# Patient Record
(demographics unavailable — no encounter records)

---

## 2024-10-14 NOTE — ASSESSMENT
[FreeTextEntry1] : Diffuse systemic sclerosis (LELAND positive all other serologies negative), skin bx consistent with Scl  Recently dx HFrEF (EF%25), pericardial, and pleural effusion on mycophenolate 3Gram daily ongoing work-up for a possible heart transplant off prednisone at this time with a new onset of arthralgia. Referral to see Dr. White for further assessment and treatment option  I reviewed previous labs results with patients. Diagnosis and Prognosis discussed Continue with current medications medications refilled F/u 3-4 months

## 2024-10-14 NOTE — PHYSICAL EXAM
[General Appearance - Alert] : alert [General Appearance - In No Acute Distress] : in no acute distress [Sclera] : the sclera and conjunctiva were normal [Auscultation Breath Sounds / Voice Sounds] : lungs were clear to auscultation bilaterally [Heart Sounds] : normal S1 and S2 [Heart Sounds Gallop] : no gallops [Heart Sounds Pericardial Friction Rub] : no pericardial rub [Abnormal Walk] : normal gait [Nail Clubbing] : no clubbing  or cyanosis of the fingernails [Musculoskeletal - Swelling] : no joint swelling seen [No Focal Deficits] : no focal deficits [Oriented To Time, Place, And Person] : oriented to person, place, and time [Impaired Insight] : insight and judgment were intact [Bowel Sounds] : normal bowel sounds [Abdomen Soft] : soft [Abdomen Tenderness] : non-tender [] : no hepato-splenomegaly [Abdomen Mass (___ Cm)] : no abdominal mass palpated [Cervical Lymph Nodes Enlarged Posterior Bilaterally] : posterior cervical [Cervical Lymph Nodes Enlarged Anterior Bilaterally] : anterior cervical [Supraclavicular Lymph Nodes Enlarged Bilaterally] : supraclavicular [FreeTextEntry1] : changes in hands/arms c/w scleroderma but no new lesions and thickening not worse

## 2024-10-14 NOTE — HISTORY OF PRESENT ILLNESS
[___ Month(s) Ago] : [unfilled] month(s) ago [FreeTextEntry1] : scleroderma is stable - on mycophenolate since 04/2024 on 3 Grams has been off steroid for the last 3 weeks - body achiness is back - reports some mild stiffness as well.  new onset of Raynaud's - now that the weather is colder last echo with still large pericardial effusion. Left ventricle is severely dilated.  ongoing heart transplant work-up

## 2024-10-14 NOTE — REVIEW OF SYSTEMS
[Feeling Poorly] : feeling poorly [Feeling Tired] : feeling tired [Joint Pain] : joint pain [As Noted in HPI] : as noted in HPI [Skin Lesions] : skin lesion [Negative] : Heme/Lymph

## 2024-10-22 NOTE — PHYSICAL EXAM
[General Appearance - Alert] : alert [General Appearance - In No Acute Distress] : in no acute distress [General Appearance - Well Nourished] : well nourished [Sclera] : the sclera and conjunctiva were normal [PERRL With Normal Accommodation] : pupils were equal in size, round, and reactive to light [Neck Appearance] : the appearance of the neck was normal [Respiration, Rhythm And Depth] : normal respiratory rhythm and effort [Auscultation Breath Sounds / Voice Sounds] : lungs were clear to auscultation bilaterally [Heart Rate And Rhythm] : heart rate was normal and rhythm regular [Heart Sounds] : normal S1 and S2 [Bowel Sounds] : normal bowel sounds [Abdomen Tenderness] : non-tender [] : no hepato-splenomegaly [Abnormal Walk] : normal gait [Nail Clubbing] : no clubbing  or cyanosis of the fingernails [Musculoskeletal - Swelling] : no joint swelling seen [Cranial Nerves] : cranial nerves 2-12 were intact [Deep Tendon Reflexes (DTR)] : deep tendon reflexes were 2+ and symmetric [Sensation] : the sensory exam was normal to light touch and pinprick [Oriented To Time, Place, And Person] : oriented to person, place, and time [Impaired Insight] : insight and judgment were intact [Affect] : the affect was normal

## 2024-10-28 NOTE — HISTORY OF PRESENT ILLNESS
[de-identified] : This is a 42 year old female with past medical history of PCOD came in for CPE  On Farsiga 10 qd , Mycophenolate 3000 qd, Entresto 49/51 BID, Metoprolol 50 BID , Spironolactone 25 BID , Bumex 1 qd , mexiletine   hx recent HFrEF/NICM (EF 15%, LVEDD 5.1 cm; Dx 5/24---> improved EF to 25 % 9/2024 ) with mod pericardial effusion, s/p CRT-D (Dr. Umana 6/27/24) Sustained MMVT (s/p VT ablation 6/24/24),S/p cardiac biopsy 6/2024 - followed by cardio and cardiothoracic MD  -met cardiothoracic md 9/2024 - looking to go on heart transplant list -under work up   Dx UMCTD -Scleroderma -on mycophenelate full dose now since 6/2024 3000 mg qd  -  off on MTX and FA since 3/2024  -off prednisone amiodarone bactrium , eliquis  got covid vaccine 4/2021 - then 7/2021 CPE it was noted hyperpigmentation of skin - 12/2021 saw derm did BX patch testing BW - Biopsy and patch testing neg - BW showed + adv to see rheu - she was Rheum BW was ok - saw derm 2nd opnion 2022 - gave bleeching cream - later developed arm joint pains  -was seeing ortho hand sp for CTS - cortisone shots he adv to see rheum again - now seeing rheum since  c/o dry skin saw dermatologist- did Biopsy - dx as dermatitis- / exczema? martha - gave ointment betamethasone BID -helps with itching - rash still there - has f/u tomorrow  hands chest and belly itchy and bleeds  -seeing derm since 12/21 - saw rheumatologist autoimmune w/u neg  -c/o B/L shoulder pain with decrease ROM cannot were her cloths - hurt to comb hair    hx CTS- b/l hands -saw otho 2019 got cortisone shots rt hand 2019 - got better  - Left hand started 2021 - saw ortho got cortisone shot  - doing wrist brace   hx DMtype 2- resolved aic 5.5 7/21 - resolved with diet and exercise, cut carbs, like rice , roti , potato etc , takes fruits , on and off exercise lost 70 lbs   History of polycystic ovary disease-irregular cycle intervals before - now regular LMP 2 weeks ago  PAP- due will schedule appt last 2019   Obesity- Dyslipidemia- sedentary life , no exercise   Vitamin D deficiency-at present taking Vitamin D 1000 units daily   has pet DOG

## 2024-10-28 NOTE — ASSESSMENT
[FreeTextEntry1] : A 42-year-old female with seronegative systemic sclerosis/scleroderma (skin biopsy 04/24), heart failure with reduced ejection fraction (15% as of 05/24), s/p catheter-directed thrombolysis and ventricular tachycardia ablation (06/24), and Raynaud's phenomenon, presents for an initial consultation following recent hospital discharge. She was hospitalized for cardiogenic shock, requiring intra-aortic balloon pump (IABP) and milrinone, and underwent direct current cardioversion (DCCV). An endomyocardial biopsy revealed patchy interstitial fibrosis and myocyte hypertrophic damage. Examination is benign. FibroScan results show  dB/m and an E of 42.1 kPa with a 37% IQR, which is inconclusive for cirrhosis (due to high IQR). Possible hepatic congestion- might be accounting for high E score. however she is euvolemic on examination.  PLAN  # High Liver Stiffness Score -Advanced fibrosis need to be rule out. Unfortunately, attempted 2 fibroscan at office and unable to obtain accurate reading. Recommending to perform MRE to evaluate fibrosis.  -We reviewed her inpatient blood work from [facility], which showed a normal iron panel, unremarkable hepatitis panel, and normal immunoglobulin levels. The workup will be completed with additional testing including an autoimmune serologic panel, ceruloplasmin, and alpha-1 antitrypsin all resulted as negative.   # HFrEF -The patient is doing well on medical therapy. We discussed the need for a liver biopsy (possibly transjugular route) to evaluate if cirrhosis is present, which would impact the decision for a potential heart transplant. A discussion with the heart failure attending, Dr. Schreiber is pending.  - Awaiting MRE to assess fibrosis status due to multiple failed fibroscan attempts.  Will discuss MRE Results over phone. The treatment plan was reviewed in consultation with Dr. Brody.  Tena Guerrero, MSN, FNP-BC Transplant Hepatology Nurse Practitioner [facility] [address] [place] [address] T: [phone] | F: [phone].

## 2024-10-28 NOTE — HEALTH RISK ASSESSMENT
[Good] : ~his/her~  mood as  good [No] : In the past 12 months have you used drugs other than those required for medical reasons? No [No falls in past year] : Patient reported no falls in the past year [0] : 2) Feeling down, depressed, or hopeless: Not at all (0) [PHQ-2 Negative - No further assessment needed] : PHQ-2 Negative - No further assessment needed [Never] : Never [With Significant Other] : lives with significant other [Unemployed] : unemployed [] :  [Fully functional (bathing, dressing, toileting, transferring, walking, feeding)] : Fully functional (bathing, dressing, toileting, transferring, walking, feeding) [Fully functional (using the telephone, shopping, preparing meals, housekeeping, doing laundry, using] : Fully functional and needs no help or supervision to perform IADLs (using the telephone, shopping, preparing meals, housekeeping, doing laundry, using transportation, managing medications and managing finances) [de-identified] : walking [[id number]] : 0 [Reports changes in hearing] : Reports no changes in hearing [Reports changes in vision] : Reports no changes in vision [Reports changes in dental health] : Reports no changes in dental health

## 2024-10-28 NOTE — REVIEW OF SYSTEMS
[Fatigue] : fatigue [Dyspnea on Exertion] : dyspnea on exertion [Negative] : Heme/Lymph [FreeTextEntry5] : see HPI  [de-identified] : skin changes

## 2024-10-28 NOTE — HEALTH RISK ASSESSMENT
[Good] : ~his/her~  mood as  good [No] : In the past 12 months have you used drugs other than those required for medical reasons? No [No falls in past year] : Patient reported no falls in the past year [0] : 2) Feeling down, depressed, or hopeless: Not at all (0) [PHQ-2 Negative - No further assessment needed] : PHQ-2 Negative - No further assessment needed [Never] : Never [With Significant Other] : lives with significant other [Unemployed] : unemployed [] :  [Fully functional (bathing, dressing, toileting, transferring, walking, feeding)] : Fully functional (bathing, dressing, toileting, transferring, walking, feeding) [Fully functional (using the telephone, shopping, preparing meals, housekeeping, doing laundry, using] : Fully functional and needs no help or supervision to perform IADLs (using the telephone, shopping, preparing meals, housekeeping, doing laundry, using transportation, managing medications and managing finances) [de-identified] : walking [[id number]] : 0 [Reports changes in hearing] : Reports no changes in hearing [Reports changes in vision] : Reports no changes in vision [Reports changes in dental health] : Reports no changes in dental health

## 2024-10-28 NOTE — HISTORY OF PRESENT ILLNESS
[FreeTextEntry1] : The patient is a 42-year-old female with seronegative systemic sclerosis/scleroderma (skin biopsy 04/24), heart failure with reduced ejection fraction (HFrEF) and non-ischemic cardiomyopathy (EF 15% as of 05/24), and Raynaud's phenomenon. She presents for initial consultation following a recent hospital discharge. She was admitted in June 2024 to [facility] and subsequently [facility] for cardiogenic shock, requiring intra-aortic balloon pump (IABP), milrinone, and direct current cardioversion (DCCV). An endomyocardial biopsy revealed patchy interstitial fibrosis and myocyte hypertrophic damage.  She was discharged on a regimen including mexiletine 200mg BID, Losartan 25 mg BID, spironolactone 25 mg BID, Metoprolol 50mg BID, Entresto 49/51mg BID, Farxiga 10mg daily, and Bumex 1 mg QD, which she is tolerating. For her systemic sclerosis, she is on Cellcept 1500 mg BID (since 04/24). Solumedrol has been discontinued since last follow up.  Advanced therapy evaluation was initiated during her hospitalization, and she is now being evaluated by hepatology for suspected liver fibrosis. Since discharge, she reports doing well with some end-of-day fatigue but denies using any over-the-counter medications, eastern medicines, or herbal supplements.  Social History: Previously worked in banking, now at home; no smoking, alcohol, or illicit drug use. Family History: No history of liver cancer or disease; no gastrointestinal issues.  Patient returns today for routine follow up and Fibroscan. Unfortunately, due to extensive heart disease, Fibroscan was an inaccurate exam to be performing to determine patient's fibrosis status. I also [facility] r[facility] in August which ruled out CLD etiology. Recent lab results available for review from Sept reveal normal LFTs.

## 2024-10-28 NOTE — ASSESSMENT
[FreeTextEntry1] : A 42-year-old female with seronegative systemic sclerosis/scleroderma (skin biopsy 04/24), heart failure with reduced ejection fraction (15% as of 05/24), s/p catheter-directed thrombolysis and ventricular tachycardia ablation (06/24), and Raynaud's phenomenon, presents for an initial consultation following recent hospital discharge. She was hospitalized for cardiogenic shock, requiring intra-aortic balloon pump (IABP) and milrinone, and underwent direct current cardioversion (DCCV). An endomyocardial biopsy revealed patchy interstitial fibrosis and myocyte hypertrophic damage. Examination is benign. FibroScan results show  dB/m and an E of 42.1 kPa with a 37% IQR, which is inconclusive for cirrhosis (due to high IQR). Possible hepatic congestion- might be accounting for high E score. however she is euvolemic on examination.  PLAN  # High Liver Stiffness Score -Advanced fibrosis need to be rule out. Unfortunately, attempted 2 fibroscan at office and unable to obtain accurate reading. Recommending to perform MRE to evaluate fibrosis.  -We reviewed her inpatient blood work from [facility], which showed a normal iron panel, unremarkable hepatitis panel, and normal immunoglobulin levels. The workup will be completed with additional testing including an autoimmune serologic panel, ceruloplasmin, and alpha-1 antitrypsin all resulted as negative.   # HFrEF -The patient is doing well on medical therapy. We discussed the need for a liver biopsy (possibly transjugular route) to evaluate if cirrhosis is present, which would impact the decision for a potential heart transplant. A discussion with the heart failure attending, Dr. Schreiber is pending.  - Awaiting MRE to assess fibrosis status due to multiple failed fibroscan attempts.  Will discuss MRE Results over phone. The treatment plan was reviewed in consultation with Dr. Brody.  Tena Guerrero, MSN, FNP-BC Transplant Hepatology Nurse Practitioner [facility] [address] [place] [address] T: [phone] | F: [phone].

## 2024-10-28 NOTE — HISTORY OF PRESENT ILLNESS
[de-identified] : This is a 42 year old female with past medical history of PCOD came in for CPE  On Farsiga 10 qd , Mycophenolate 3000 qd, Entresto 49/51 BID, Metoprolol 50 BID , Spironolactone 25 BID , Bumex 1 qd , mexiletine   hx recent HFrEF/NICM (EF 15%, LVEDD 5.1 cm; Dx 5/24---> improved EF to 25 % 9/2024 ) with mod pericardial effusion, s/p CRT-D (Dr. Umana 6/27/24) Sustained MMVT (s/p VT ablation 6/24/24),S/p cardiac biopsy 6/2024 - followed by cardio and cardiothoracic MD  -met cardiothoracic md 9/2024 - looking to go on heart transplant list -under work up   Dx UMCTD -Scleroderma -on mycophenelate full dose now since 6/2024 3000 mg qd  -  off on MTX and FA since 3/2024  -off prednisone amiodarone bactrium , eliquis  got covid vaccine 4/2021 - then 7/2021 CPE it was noted hyperpigmentation of skin - 12/2021 saw derm did BX patch testing BW - Biopsy and patch testing neg - BW showed + adv to see rheu - she was Rheum BW was ok - saw derm 2nd opnion 2022 - gave bleeching cream - later developed arm joint pains  -was seeing ortho hand sp for CTS - cortisone shots he adv to see rheum again - now seeing rheum since  c/o dry skin saw dermatologist- did Biopsy - dx as dermatitis- / exczema? martha - gave ointment betamethasone BID -helps with itching - rash still there - has f/u tomorrow  hands chest and belly itchy and bleeds  -seeing derm since 12/21 - saw rheumatologist autoimmune w/u neg  -c/o B/L shoulder pain with decrease ROM cannot were her cloths - hurt to comb hair    hx CTS- b/l hands -saw otho 2019 got cortisone shots rt hand 2019 - got better  - Left hand started 2021 - saw ortho got cortisone shot  - doing wrist brace   hx DMtype 2- resolved aic 5.5 7/21 - resolved with diet and exercise, cut carbs, like rice , roti , potato etc , takes fruits , on and off exercise lost 70 lbs   History of polycystic ovary disease-irregular cycle intervals before - now regular LMP 2 weeks ago  PAP- due will schedule appt last 2019   Obesity- Dyslipidemia- sedentary life , no exercise   Vitamin D deficiency-at present taking Vitamin D 1000 units daily   has pet DOG

## 2024-10-28 NOTE — ASSESSMENT
[FreeTextEntry1] : 41 y/o F w/ h/o seronegative systemic sclerosis (Dx 2022 via skin biopsy), recent HFrEF/NICM (EF 15%, LVEDD 5.1 cm; Dx 5/24---> improved EF to 25 % 9/2024 ) with mod pericardial effusion, s/p CRT-D (Dr. Umana 6/27/24) Sustained MMVT (s/p VT ablation 6/24/24), who presents for CPE  Systemic Scleroderma affecting Myocardium - MANZO better + fatigue  saw Rheum 7/2024 -on mycophenolate 3Gram daily, ongoing work-up for a possible heart transplant off prednisone at this time with a new onset of arthralgia. -followed by cardio 7/9/24 - changed losartan to Entresto 24-26 BID  , off losartan - added farsiga 10  , metoprolol increased to 25 BID  -s/p Ablation 6/24/2024 and 6/26/24 , s/p ICD 6/27/24  on Bactrum  for 30 days for Ablation Eliquis for 1 month -has f/u with rheum 7/18 24- on prednisone down to 16 mg qd  - on Mexiletine 200  BID and amiodarone 200 - followed by EPS-saw  7/12/24 has f/u 9/2024 (TFT 6/2024 NL ) - S/P endomyocardial biopsy, showing patchy interstitial fibrosis and myocyte hypertrophic changes consistent with scleroderma. She was discharged on Losartan 25mg BID, Spironolactone 25mg, Toprol XL 12.5mg qd, Bumex 1mg qd, [facility] and is on Medrol 16mg qd and Cellcept 1500mg qd. CT/MRI: 5/10/24 - cardiac MRI - LVEF 13%, LVEDVi 83ml/m2, Apparent LGE of basal lateral myocardium (may represent artifact), RVEF 8%, RVEDVi 88ml/m2, MR noted.   Device/PPM/ICD: Medtronic CRT-D Dr. Umana 6/27/24   EP: VT ablation 6/24/24 of LV septum with Repeat EP Study 6/26/24 unable to induce   CATH 5/9/24 non obstructive CAD  ECHO 6/9/24 Procedure: Echo 2D Findings and Treatment: CONCLUSIONS: 1. Reduced right ventricular systolic function. 2. Compared to the transthoracic echocardiogram performed on 6/26/2024, no change. 3. Left ventricular endocardium is not well visualized; however, the left ventricular systolic function appears severely reduced. 4. Moderate localized pericardial effusion noted adjacent to the left ventricle, small pericardial effusion noted adjacent to the right ventricle, moderate pericardial effusion noted adjacent to the right atrium and moderate pericardial effusion noted adjacent to the posterior left ventricle with raised intra-pericardial pressures: greater than 60% respiratory variation across the tricuspid valve E wave, respiratory variation across the mitral valve E wave is not greater than 30%, no early diastolic inversion of the right ventricle, no diastolic collapse of right atrium, exceding 1/3 of the cardiac cycle, inferior vena cava is plethoric with blunted respiratory variation and inferior vena cava not plethoric and does not display blunted respiratory variation ECHO -5/3/24 - LVEF 19%, LVIDD 5.1cm, enlarged RV and decreased RV systolic function, mild MR, moderate pericardial effusion    Heart failure with reduced EF - 19% Dx 5/2/24--> EF 25 % 9/2024 -scheduled for cardio pulm exercise tste 11/10/24  -will be starting cardiac rehab program  - followed by CHF clinic  Dr Schreiber 7/29/24 , 8/14/24  -on Bumetanide 1 mg qd  ECHO 9/18/24-CONCLUSIONS:  1. Technically difficult image quality.  2. Left ventricular cavity is severely dilated. Left ventricular wall thickness is normal. Left ventricular systolic function is severely decreased with an ejection fraction of 25 % by Nevarez's method of disks. Global left ventricular hypokinesis.  3. Mildly enlarged right ventricular cavity size and moderately reduced right ventricular systolic function.  4. Estimated pulmonary artery systolic pressure is 25 mmHg.  5. Moderate tricuspid regurgitation.  6. There is a large circumferential pericardial effusion measuring 2.1 cm posterior/lateral to the left ventricle. There are no clear echo signs of increased intrapericardial pressures (IVC is non-dilated and collapsible, no RV diastolic collapse is evident, no respiratory variation in AV valve inflow).  7. Compared to the transthoracic echocardiogram performed on 6/28/2024, the pericardial effusion appears grossly similar in size. There are no current signs of increased pericardial pressures by TTE. Note blood pressure today is 93/66 mmHg. CT angio chest- 5/2/24 IMPRESSION: No pulmonary embolism. Moderate right and small left pleural effusions with associated atelectasis. Right pleural effusion is partially loculated. Moderate pericardial effusion ECHO 5/3/24-. Left ventricular systolic function is severely decreased with an ejection fraction of 19 %. Enlarged right ventricular cavity size and reduced systolic function, Moderate pericardial effusion noted adjacent to the lateral left ventricle and trace pericardial effusion noted adjacent to the right ventricle,Right pleural effusion noted. ECHO 5/4/24-CONCLUSIONS: 1. Moderate localized pericardial effusion noted adjacent to the left ventricle, small pericardial effusion noted adjacent to the right ventricle and moderate pericardial effusion noted adjacent to the right atrium with no evidence of hemodynamic compromise (or echocardiographic evidence of cardiac tamponade): no diastolic collapse of right atrium, exceding 1/3 of the cardiac cycle, no early diastolic inversion of the right ventricle, respiratory variation across the mitral valve E wave is not greater than 30% and respiratory variation across the tricuspid valve E wave is not greater than 60%. The heart continues to have a swinging "to and fro" motion as previously described. 2. Compared to the transthoracic echocardiogram performed on 5/3/2024, there have been no significant interval changes.' ECHO 5/9/24CONCLUSIONS: 1. Limited study to re-evaluate pericardial effusion.  2. There is a moderate pericardial effusion noted adjacent to the posterior left ventricle, a moderate pericardial effusion noted adjacent to the lateral left ventricle and a small pericardial effusion noted adjacent to the right atrium with no evidence of hemodynamic compromise (or echocardiographic evidence of cardiac tamponade). Moderate pericardial effusion, measuring ~ 1.3 cm posterior to the left ventricle and ~ 1.0 cm lateral to the left ventricle. Small pericardial effusion superior to the right atrium and adjacent to the RV free wall. No echocardiographic evidence of cardiac tamponade.  3. The inferior vena cava is normal in size (normal <2.1cm) with abnormal inspiratory collapse (abnormal <50%) consistent with mildly elevated right atrial pressure (~8, range 5-10mmHg).  4. Compared to the transthoracic echocardiogram performed on 5/4/2024, there have been no significant interval changes. Cardiac CATH 5/9/24-Diagnostic cath showed mild CAD with no flow limiting lesions. The LVEDP at the time of the case was 17 mmHg MRI Cardiac 5/10/24- IMPRESSION: Enlarged right ventricle with associated hypokinesis. Normal left ventricular size with global left ventricular hypokinesis with sparing of the septum. Limited late gadolinium enhancement images. Apparent late gadolinium enhancement at the basal lateral myocardial that may represent artifact -continue losartan 12.5mg qd,on bumex 1mg qd, spironolactone 25mg qd -fluid restriction 1.5 liters - low sodium diet- weight monitoring -cardio f/u Dr Broderick 8/14/24   Arthralgias/+ LELAND UCTD Syndrome w/ abnl imaging dx scleroderma 3/2024 - now followed by rheum -last visit 7/1/24 - tapering on prednisone - 16 mg qd  -no response to Plaquenil - off  MTX 4/2024  , restarted Cellcept 5/15/24 on 3000 qd ( 1500 BID )  -TTE done Oct 2023 wnl EF 55 , no evidence PAH 02/19/2024 CT chest -IMPRESSION: Clear lungs. No CT evidence for interstitial lung disease.  saw hepatologist 8/7/24 - did Fibroscan 8/2024 and 10/24 - will be getting Liver MRI - pending approval - will do Biopsy of inconclusive   Skin eczema- hyperpigmentation generalized dx as scleroderma - Dermatology evaluation -PAth 4/21/24-Final Diagnosis Left upper arm, punch biopsy - Altered collagen, consistent with scleroderma -followed by dermatologist -bx records 12/21 reviewed - saw second opnion derm 7/21/22 -rheum consult reviewed -adv to see Endo again hx low cortison levle 10/23 - r/o addisions dz has appt in 10.2024 adv to see someone early  Dyslipidemia- high LDL- 107 10/23 - educated patient low-fat diet aerobic exercises and weight loss. We'll check lipid panel today.  CTS- b/l hands -saw otho 2019 got cortisone shots rt hand 2019 - got better - Left hand started 2021 - saw ortho got cortisone shot no wbetter - saw neurologist also - continue wrist brace  MORBID OBESITY/PCOS -BMI 34 now -->33 --> 32 -hx infertility -saw Infertility doctor -lost 70 lbs 2018 - had son 2019 natural  DM- AIc 5.8 stable 7/16/24 POC  repeat Microalbumin ,not on metformin yet , low carb diet exercise and loose weight  Vitamin D deficiency. on 50,000 vit D q weekly , Continue supplements  Health maintenance Pap smear- 2019 - advised pending - referral placed mammo due -ordered again Tetanus vaccine-5/2019 Pneumovax refused Flu vaccine--10/10/2024  [employer] -4/13/21, 5/4/21. Booster advised.

## 2024-10-28 NOTE — HISTORY OF PRESENT ILLNESS
[FreeTextEntry1] : The patient is a 42-year-old female with seronegative systemic sclerosis/scleroderma (skin biopsy 04/24), heart failure with reduced ejection fraction (HFrEF) and non-ischemic cardiomyopathy (EF 15% as of 05/24), and Raynaud's phenomenon. She presents for initial consultation following a recent hospital discharge. She was admitted in June 2024 to [facility] and subsequently [facility] for cardiogenic shock, requiring intra-aortic balloon pump (IABP), milrinone, and direct current cardioversion (DCCV). An endomyocardial biopsy revealed patchy interstitial fibrosis and myocyte hypertrophic damage.  She was discharged on a regimen including mexiletine 200mg BID, Losartan 25 mg BID, spironolactone 25 mg BID, Metoprolol 50mg BID, Entresto 49/51mg BID, Farxiga 10mg daily, and Bumex 1 mg QD, which she is tolerating. For her systemic sclerosis, she is on Cellcept 1500 mg BID (since 04/24). Solumedrol has been discontinued since last follow up.  Advanced therapy evaluation was initiated during her hospitalization, and she is now being evaluated by hepatology for suspected liver fibrosis. Since discharge, she reports doing well with some end-of-day fatigue but denies using any over-the-counter medications, eastern medicines, or herbal supplements.  Social History: Previously worked in banking, now at home; no smoking, alcohol, or illicit drug use. Family History: No history of liver cancer or disease; no gastrointestinal issues.  Patient returns today for routine follow up and Fibroscan. Unfortunately, due to extensive heart disease, Fibroscan was an inaccurate exam to be performing to determine patient's fibrosis status. I also reviewed previous lab results from [facility] with Dr Brody in August which ruled out CLD etiology. Recent lab results available for review from Sept reveal normal LFTs.

## 2024-10-28 NOTE — REVIEW OF SYSTEMS
[Fatigue] : fatigue [Dyspnea on Exertion] : dyspnea on exertion [Negative] : Heme/Lymph [FreeTextEntry5] : see HPI  [de-identified] : skin changes

## 2024-10-28 NOTE — ASSESSMENT
[FreeTextEntry1] : 41 y/o F w/ h/o seronegative systemic sclerosis (Dx 2022 via skin biopsy), recent HFrEF/NICM (EF 15%, LVEDD 5.1 cm; Dx 5/24---> improved EF to 25 % 9/2024 ) with mod pericardial effusion, s/p CRT-D (Dr. Umana 6/27/24) Sustained MMVT (s/p VT ablation 6/24/24), who presents for CPE  Systemic Scleroderma affecting Myocardium - MANZO better + fatigue  saw Rheum 7/2024 -on mycophenolate 3Gram daily, ongoing work-up for a possible heart transplant off prednisone at this time with a new onset of arthralgia. -followed by cardio 7/9/24 - changed losartan to Entresto 24-26 BID  , off losartan - added farsiga 10  , metoprolol increased to 25 BID  -s/p Ablation 6/24/2024 and 6/26/24 , s/p ICD 6/27/24  on Bactrum  for 30 days for Ablation Eliquis for 1 month -has f/u with rheum 7/18 24- on prednisone down to 16 mg qd  - on Mexiletine 200  BID and amiodarone 200 - followed by EPS-saw  7/12/24 has f/u 9/2024 (TFT 6/2024 NL ) - S/P endomyocardial biopsy, showing patchy interstitial fibrosis and myocyte hypertrophic changes consistent with scleroderma. She was discharged on Losartan 25mg BID, Spironolactone 25mg, Toprol XL 12.5mg qd, Bumex 1mg qd, [facility] and is on Medrol 16mg qd and Cellcept 1500mg qd. CT/MRI: 5/10/24 - cardiac MRI - LVEF 13%, LVEDVi 83ml/m2, Apparent LGE of basal lateral myocardium (may represent artifact), RVEF 8%, RVEDVi 88ml/m2, MR noted.   Device/PPM/ICD: Med[employer]onic CRT-D Dr. Umana 6/27/24   EP: VT ablation 6/24/24 of LV septum with Repeat EP Study 6/26/24 unable to induce   CATH 5/9/24 non obstructive CAD  ECHO 6/9/24 Procedure: Echo 2D Findings and Treatment: CONCLUSIONS: 1. Reduced right ventricular systolic function. 2. Compared to the transthoracic echocardiogram performed on 6/26/2024, no change. 3. Left ventricular endocardium is not well visualized; however, the left ventricular systolic function appears severely reduced. 4. Moderate localized pericardial effusion noted adjacent to the left ventricle, small pericardial effusion noted adjacent to the right ventricle, moderate pericardial effusion noted adjacent to the right atrium and moderate pericardial effusion noted adjacent to the posterior left ventricle with raised intra-pericardial pressures: greater than 60% respiratory variation across the tricuspid valve E wave, respiratory variation across the mitral valve E wave is not greater than 30%, no early diastolic inversion of the right ventricle, no diastolic collapse of right atrium, exceding 1/3 of the cardiac cycle, inferior vena cava is plethoric with blunted respiratory variation and inferior vena cava not plethoric and does not display blunted respiratory variation ECHO -5/3/24 - LVEF 19%, LVIDD 5.1cm, enlarged RV and decreased RV systolic function, mild MR, moderate pericardial effusion    Heart failure with reduced EF - 19% Dx 5/2/24--> EF 25 % 9/2024 -scheduled for cardio pulm exercise tste 11/10/24  -will be starting cardiac rehab program  - followed by CHF clinic  Dr Schreiber 7/29/24 , 8/14/24  -on Bumetanide 1 mg qd  ECHO 9/18/24-CONCLUSIONS:  1. Technically difficult image quality.  2. Left ventricular cavity is severely dilated. Left ventricular wall thickness is normal. Left ventricular systolic function is severely decreased with an ejection fraction of 25 % by Nevarez's method of disks. Global left ventricular hypokinesis.  3. Mildly enlarged right ventricular cavity size and moderately reduced right ventricular systolic function.  4. Estimated pulmonary artery systolic pressure is 25 mmHg.  5. Moderate tricuspid regurgitation.  6. There is a large circumferential pericardial effusion measuring 2.1 cm posterior/lateral to the left ventricle. There are no clear echo signs of increased intrapericardial pressures (IVC is non-dilated and collapsible, no RV diastolic collapse is evident, no respiratory variation in AV valve inflow).  7. Compared to the transthoracic echocardiogram performed on 6/28/2024, the pericardial effusion appears grossly similar in size. There are no current signs of increased pericardial pressures by TTE. Note blood pressure today is 93/66 mmHg. CT angio chest- 5/2/24 IMPRESSION: No pulmonary embolism. Moderate right and small left pleural effusions with associated atelectasis. Right pleural effusion is partially loculated. Moderate pericardial effusion ECHO 5/3/24-. Left ventricular systolic function is severely decreased with an ejection fraction of 19 %. Enlarged right ventricular cavity size and reduced systolic function, Moderate pericardial effusion noted adjacent to the lateral left ventricle and trace pericardial effusion noted adjacent to the right ventricle,Right pleural effusion noted. ECHO 5/4/24-CONCLUSIONS: 1. Moderate localized pericardial effusion noted adjacent to the left ventricle, small pericardial effusion noted adjacent to the right ventricle and moderate pericardial effusion noted adjacent to the right atrium with no evidence of hemodynamic compromise (or echocardiographic evidence of cardiac tamponade): no diastolic collapse of right atrium, exceding 1/3 of the cardiac cycle, no early diastolic inversion of the right ventricle, respiratory variation across the mitral valve E wave is not greater than 30% and respiratory variation across the tricuspid valve E wave is not greater than 60%. The heart continues to have a swinging "to and fro" motion as previously described. 2. Compared to the transthoracic echocardiogram performed on 5/3/2024, there have been no significant interval changes.' ECHO 5/9/24CONCLUSIONS: 1. Limited study to re-evaluate pericardial effusion.  2. There is a moderate pericardial effusion noted adjacent to the posterior left ventricle, a moderate pericardial effusion noted adjacent to the lateral left ventricle and a small pericardial effusion noted adjacent to the right atrium with no evidence of hemodynamic compromise (or echocardiographic evidence of cardiac tamponade). Moderate pericardial effusion, measuring ~ 1.3 cm posterior to the left ventricle and ~ 1.0 cm lateral to the left ventricle. Small pericardial effusion superior to the right atrium and adjacent to the RV free wall. No echocardiographic evidence of cardiac tamponade.  3. The inferior vena cava is normal in size (normal <2.1cm) with abnormal inspiratory collapse (abnormal <50%) consistent with mildly elevated right atrial pressure (~8, range 5-10mmHg).  4. Compared to the transthoracic echocardiogram performed on 5/4/2024, there have been no significant interval changes. Cardiac CATH 5/9/24-Diagnostic cath showed mild CAD with no flow limiting lesions. The LVEDP at the time of the case was 17 mmHg MRI Cardiac 5/10/24- IMPRESSION: Enlarged right ventricle with associated hypokinesis. Normal left ventricular size with global left ventricular hypokinesis with sparing of the septum. Limited late gadolinium enhancement images. Apparent late gadolinium enhancement at the basal lateral myocardial that may represent artifact -continue losartan 12.5mg qd,on bumex 1mg qd, spironolactone 25mg qd -fluid restriction 1.5 liters - low sodium diet- weight monitoring -cardio f/u Dr Broderick 8/14/24   Arthralgias/+ LELAND UCTD Syndrome w/ abnl imaging dx scleroderma 3/2024 - now followed by rheum -last visit 7/1/24 - tapering on prednisone - 16 mg qd  -no response to Plaquenil - off  MTX 4/2024  , restarted Cellcept 5/15/24 on 3000 qd ( 1500 BID )  -TTE done Oct 2023 wnl EF 55 , no evidence PAH 02/19/2024 CT chest -IMPRESSION: Clear lungs. No CT evidence for interstitial lung disease.  saw hepatologist 8/7/24 - did Fibroscan 8/2024 and 10/24 - will be getting Liver MRI - pending approval - will do Biopsy of inconclusive   Skin eczema- hyperpigmentation generalized dx as scleroderma - Dermatology evaluation -PAth 4/21/24-Final Diagnosis Left upper arm, punch biopsy - Altered collagen, consistent with scleroderma -followed by dermatologist -bx records 12/21 reviewed - saw second opnion derm 7/21/22 -rheum consult reviewed -adv to see Endo again hx low cortison levle 10/23 - r/o addisions dz has appt in 10.2024 adv to see someone early  Dyslipidemia- high LDL- 107 10/23 - educated patient low-fat diet aerobic exercises and weight loss. We'll check lipid panel today.  CTS- b/l hands -saw otho 2019 got cortisone shots rt hand 2019 - got better - Left hand started 2021 - saw ortho got cortisone shot no wbetter - saw neurologist also - continue wrist brace  MORBID OBESITY/PCOS -BMI 34 now -->33 --> 32 -hx infertility -saw Infertility doctor -lost 70 lbs 2018 - had son 2019 natural  DM- AIc 5.8 stable 7/16/24 POC  repeat Microalbumin ,not on metformin yet , low carb diet exercise and loose weight  Vitamin D deficiency. on 50,000 vit D q weekly , Continue supplements  Health maintenance Pap smear- 2019 - advised pending - referral placed mammo due -ordered again Tetanus vaccine-5/2019 Pneumovax refused Flu vaccine--10/10/2024  [employer]izer -4/13/21, 5/4/21. Booster advised.

## 2024-11-14 NOTE — HISTORY OF PRESENT ILLNESS
[FreeTextEntry1] : Ms. Hernandez is a 43 y/o F w/ h/o seronegative systemic sclerosis/scleroderma (Dx 2022 via skin biopsy), HFrEF/NICM (EF 15%, LVEDD 5.1 cm; Dx 5/24; 2/2 systemic sclerosis confirmed by cardiac biopsy) s/p CRT-D (6/2024) as secondary prevention, VT s/p ablation (6/24/24), pericardial effusion and Raynaud's who presents for routine follow-up. Due to high-risk features, she has an open evaluation for VAD/transplant (ABO B).   For full initial details, please refer to note from 5/17/24.   Recently hospitalized June 2024 for ADHF and VT. RHC w/ elevated filling press & low CI requiring IABP for mechanical support & milrinone. AT evaluation launched as backup. Weaned off IABP and milrinone then had monomorphic VT requiring DCCV. Ultimately underwent VT ablation on 6/24 (on Eliquis for 30 days post-ablation). Endomyocardial biopsy also performed showing patchy interstitial fibrosis and myocyte hypertrophic changes consistent with scleroderma. Since discharge, device interrogation revealing frequent episodes of NSVT for which BB have been escalated. Interval TTE from 9/18 with ongoing pericardial effusion but no signs of tamponade and LVEF mildly improved.   Last visit with HF was 9/30/24. Underwent a CPET and results were discussed by Dr. Schreiber to patient in clinic today. AT is stable, she is actively caring for her 5 year old son. She walked in the [employer] recently and walked 1.25 miles without any SOB. No issues with ADLs and with housework like laundry and mopping, no SOB. Prior endorsed LE fatigue after climbing a flight of stairs has improved though not resolved. Utilizing two pillows to sleep but no alia orthopnea or PND. Denies CP, palpitations, ABD distention and LE swelling. ICD has not fired. Her weight at home has been around 160-161lb, SBP has been high 80s to low 90s but remains asymptomatic of hypotension. Has not taken her bumex today and has not started her Toprol XL 75mg BID (only been taking 50mg BID since 9/30). Also endorses that she is having hair loss more frequently from her MRA.

## 2024-11-14 NOTE — PHYSICAL EXAM
[Normal Conjunctiva] : normal conjunctiva [Normal Venous Pressure] : normal venous pressure [Soft] : abdomen soft [Non Tender] : non-tender [Normal Bowel Sounds] : normal bowel sounds [Normal] : alert and oriented, normal memory [de-identified] : No perioral cyanosis, MMM  [de-identified] : JVP ~10 cm H2O, no HJR [de-identified] : trace BLE edema  [de-identified] : Warm peripherally

## 2024-11-14 NOTE — CARDIOLOGY SUMMARY
[de-identified] : 8/14/24 - NSR, low voltage, BiV paced 5/17/24 - NSR, PVC, low voltage, PRWP [de-identified] : TTE 9/18/24: LVEF 25% (global), mild RVE with moderately reduced function (TAPSE 1.4), normal LA, TRICIA, mild MR, mod TR, est PASP 25 mmHg, IVC normal size with normal inspiratory collapse, large circumferential pericardial effusion measuring 2.1 cm posterior/lateral to LV without clear signs of increased intrapericardial pressures   6/28/24 - EF 15%, LVEDD 5.1 cm) mod localized pericardial effusion posterior and lateral to LV; small pericardial effusion adjacent to RV; mod adjacent to RA; >60% respriatory variation across TV; no diastolic inversion of RV or diastolic collapse of RA; IVC not plethoric (1.9 cm)  6/9/24- LVEF 20%, LVIDd 5.8cm, LVPWd 0.9 cm, IVSd 0.9cm, LA 4.15cm, RV 11.20cm, RV reduced systolic function, moderate MR, PASP 44mmHg, IVC 2.22cm, moderate pericardial effusion adjacent to posterior and lateral left ventricle and right atrium, a trace pericardial effusion noted adjacent to the apex with no evidence of RV diastolic collapse.  5/9/24 - moderate pericardial effusion adjacent to the posterior left ventricle, moderate pericardial effusion adjacent to the lateral left ventricle and a small pericardial effusion adjacent to the right atrium with no evidence of hemodynamic compromise (or echocardiographic evidence of cardiac tamponade  5/3/24 - LVEF 19%, LVIDD 5.1cm, enlarged RV and decreased RV systolic function, mild MR, moderate pericardial effusion [de-identified] : 5/10/24 - cardiac MRI - EF 13%; Enlarged right ventricle with associated hypokinesis. Normal left ventricular size with global left ventricular hypokinesis with sparing of the septum. Limited late gadolinium enhancement images. Apparent late  gadolinium enhancement at the basal lateral myocardial that may represent artifact.  [de-identified] : 6/24/24 - VT ablation of LV septum with Repeat EP Study 6/26 unable to induce   [de-identified] : 6/20/24 - RHC/EMBx - BP missing; RA 9 (v 12), RV 52/10, PA 52/16/33, PCWP 15 (v 19), CO/CI 7.4/4.2; path - patchy interstital fibrosis and myocyte hypertrophic changes; no inflammatory infiltrate seen  5/9/24 - /68, RA 7, RV 43/7/13, PAP 42/16/29, PCWP 17 (v 24), TPG 12, CO/CI: 4.89/2.79 non-obstructive coronaries  [de-identified] : CPET 11/8/2024: Protocol 10 WATT ramp, reason stopped: leg fatigue. Exercise time 8:45min. Conclusion: Abnormal CPET consistent with patient cardiomyopathy. The patient has Brito Class D cardiac failure based on the maximal oxygen uptake (peak VO2) of 8.9 ml/kg/min (pVO2<50% predicated is of greater concern in a young patient) Most markers of ventilatory efficiency were abnormal. While her cardiomyopathy is likely the proinent reason for her reduced exercise tolerance, there is also a component of deconditioning given the low markers of aerobic capacity (VO2 at AT, VO2/work slope, OUES) along with an elevated dyspnea index and exaggerated heart rate response to exercise. Of Note, the patient exhibited an oscillatory breathing pattern during early exercise which may be a marker of disease severity in heart failure patients.  There are significant markers of impaired aerobic capcity and ventilatory efficiency which are particularly concerning in a young patient for poor outcomes. Would warrant an evaluation for advanced therapies. She also demonstrated an oscillatory breathing patter which may indicate an underlying sleep disorder and would recommend a sleep study. There is a lso a compenent of deconditioning and would benefit from cardiac rehab. A home exercise prescription plan will be provided to her.

## 2024-11-14 NOTE — ASSESSMENT
[FreeTextEntry1] : 41 y/o F w/ h/o seronegative systemic sclerosis/scleroderma (Dx 2022 via skin biopsy), HFrEF/NICM (EF 15%, LVEDD 5.1 cm; Dx 5/24; 2/2 systemic sclerosis confirmed by cardiac biopsy) s/p CRT-D (6/2024) as secondary prevention, VT s/p ablation (6/24/24), pericardial effusion and Raynaud's who presents for routine follow-up. Due to high-risk features, she has an open evaluation for VAD/transplant (ABO B).  She has concerning features including mod pericardial effusion and recurrent VT. Etiology of CM is due to seronegative systemic sclerosis as can cause cardiomyopathy and pericardial effusion. It also causes pulmonary HTN however she has mild PH (mPAP 25) which is predominantly due to group II (left-sided filling pressure).   She is mildly volume up on exam, with low normotensive BP readings at home/clinic, endorsing NYHA class II symptoms. Will continue to monitor her and has ongoing open eval for VAD/transplant.   1. HFrEF/NICM  - on Entresto 49/51mg BID, further escalation limited by SBP in 90s.  - c/w Toprol XL 50mg BID. Beta-selective medication preferred to avoid exacerbation of Raynaud's - Will switch ben 25mg BID to eplerenone 25mg BID - c/w Farxiga 10 mg daily  - Diuretics: increase bumex to 2mg for 3 days and resume back to 1mg QD - Awaiting schedule for [facility] to begin. - Labs 10/28 - K4.2, BUN/Cr 14/0.84, Tbili and LFT WNL. proBNP 2542 (9/30/24) - obtain repeat labs next week of the changes above  # Advanced therapies evaluation - launched 6/2024, she is ABO B - ID: recommending Hepatitis B vaccination, patient is going to obtain soon - Hepatology: Will need to obtain MRI to evaluate the fibrosis - CPET 10/24 complete - Met CTSX Dr. Nas Song  # Ventricular tachycardia - s/p VT ablation 6/24 of LV septum with recurrence, however repeat EP Study 6/26 unable to induce - On Mexiletine 200 mg BID and BB as above - Amiodarone stopped earlier this month - Has CRT-D - Follow-up with Dr. Umana  # Systemic sclerosis - Followed by rheum (Dr. Huber/Brett) and plans to establish care with scleroderma expert at [place], Dr. Luci Segundo - On Cellcept 1500 mg twice/day  Will f/u with Dr. Schreiber in 1/2025. Come back sooner if any acute changes

## 2024-12-03 NOTE — ASSESSMENT
[FreeTextEntry1] : Systemic sclerosis (LELAND positive all other serologies negative), skin bx consistent with Scl. Recently dx HFrEF (EF%25), pericardial, and pleural effusion  1. Systemic sclerosis based on skin biopsy: has skin tightening up to the MCPs bilaterally, salt and pepper pigmentation of the skin, small oral aperture, cardiomyopathy with HFrEF (EF%25), pericardial, and pleural effusion, Raynaud's with acro-osteolysis, arthralgia.  i) Skin: skin tightening up to the MCPs bilaterally, salt and pepper pigmentation of the skin, no telangiectasias. Raynaud's symptoms with acro-osteolysis however, BP on the lower end 80s/60s which precludes use of most vasodilatory therapies. Discussed a trial of ASA 81 mg daily during winter if no contraindication per cardiology (patient denies any active GERD, PUD or GI bleed, epistaxis history). Offered SNRI but she is hesitant to try them at this time. Advised conservative management with hand warmers and avoiding triggers.   ii) MSK: has arthralgia in the knees and shoulders. Manageable for now but can consider adding Plaquenil should the symptoms become persistent.  iii) Pulmonary: No ILD per CT chest in Feb 2024. Referred to repeat HRCT Chest and baseline PFTs ordered.  iv) Cardiac:  hospitalized June 2024 for ADHF and VT. RHC w/ elevated filling press & low CI requiring IABP for mechanical support & milrinone. AT evaluation launched as backup. Weaned off IABP and milrinone then had monomorphic VT requiring DCCV. Ultimately underwent VT ablation on 6/24 (on Eliquis for 30 days post-ablation). Endomyocardial biopsy also performed showing patchy interstitial fibrosis and myocyte hypertrophic changes consistent with scleroderma. Since discharge, device interrogation revealing frequent episodes of NSVT for which BB have been escalated. Interval TTE from 9/18 with ongoing pericardial effusion but no signs of tamponade and LVEF mildly improved. Currently on Entresto, Mexiletine, Bumetanide, Eplerenone, Metoprolol and Farxiga. Being considered for cardiac transplant.  v) GI: no active issues. Patient denies GERD. No h/o peptic ulcers, GAVE, gastroparesis, GI bleed. Occasional constipation. Has liver fibrosis - follow up MRE planned.  vi) Renal: No active issues  vii) Heme: no active issues  viii) Neuro: no active issues  Follow up in 6 weeks

## 2024-12-03 NOTE — DATA REVIEWED
[FreeTextEntry1] : CPET 11/8/2024: Protocol 10 WATT ramp, reason stopped: leg fatigue. Exercise time 8:45min. Conclusion: Abnormal CPET consistent with patient cardiomyopathy. The patient has Brito Class D cardiac failure based on the maximal oxygen uptake (peak VO2) of 8.9 ml/kg/min (pVO2<50% predicated is of greater concern in a young patient) Most markers of ventilatory efficiency were abnormal. While her cardiomyopathy is likely the proinent reason for her reduced exercise tolerance, there is also a component of deconditioning given the low markers of aerobic capacity (VO2 at AT, VO2/work slope, OUES) along with an elevated dyspnea index and exaggerated heart rate response to exercise. Of Note, the patient exhibited an oscillatory breathing pattern during early exercise which may be a marker of disease severity in heart failure patients. There are significant markers of impaired aerobic capacity and ventilatory efficiency which are particularly concerning in a young patient for poor outcomes. Would warrant an evaluation for advanced therapies. She also demonstrated an oscillatory breathing patter which may indicate an underlying sleep disorder and would recommend a sleep study. There is also a component of deconditioning and would benefit from cardiac rehab. A home exercise prescription plan will be provided to her.  Echo: TTE 9/18/24: LVEF 25% (global), mild RVE with moderately reduced function (TAPSE 1.4), normal LA, TRICIA, mild MR, mod TR, est PASP 25 mmHg, IVC normal size with normal inspiratory collapse, large circumferential pericardial effusion measuring 2.1 cm posterior/lateral to LV without clear signs of increased intrapericardial pressures  6/28/24 - EF 15%, LVEDD 5.1 cm) mod localized pericardial effusion posterior and lateral to LV; small pericardial effusion adjacent to RV; mod adjacent to RA; >60% respriatory variation across TV; no diastolic inversion of RV or diastolic collapse of RA; IVC not plethoric (1.9 cm)  6/9/24- LVEF 20%, LVIDd 5.8cm, LVPWd 0.9 cm, IVSd 0.9cm, LA 4.15cm, RV 11.20cm, RV reduced systolic function, moderate MR, PASP 44mmHg, IVC 2.22cm, moderate pericardial effusion adjacent to posterior and lateral left ventricle and right atrium, a trace pericardial effusion noted adjacent to the apex with no evidence of RV diastolic collapse.  5/9/24 - moderate pericardial effusion adjacent to the posterior left ventricle, moderate pericardial effusion adjacent to the lateral left ventricle and a small pericardial effusion adjacent to the right atrium with no evidence of hemodynamic compromise (or echocardiographic evidence of cardiac tamponade  5/3/24 - LVEF 19%, LVIDD 5.1cm, enlarged RV and decreased RV systolic function, mild MR, moderate pericardial effusion    CT/MRI: 5/10/24 - cardiac MRI - EF 13%; Enlarged right ventricle with associated hypokinesis. Normal left ventricular size with global left ventricular hypokinesis with sparing of the septum. Limited late gadolinium enhancement images. Apparent late gadolinium enhancement at the basal lateral myocardial that may represent artifact.    EP: 6/24/24 - VT ablation of LV septum with Repeat EP Study 6/26 unable to induce   Cardiac Cath/PCI: 6/20/24 - RHC/EMBx - BP missing; RA 9 (v 12), RV 52/10, PA 52/16/33, PCWP 15 (v 19), CO/CI 7.4/4.2; path - patchy interstital fibrosis and myocyte hypertrophic changes; no inflammatory infiltrate seen  5/9/24 - /68, RA 7, RV 43/7/13, PAP 42/16/29, PCWP 17 (v 24), TPG 12, CO/CI: 4.89/2.79 non-obstructive coronaries

## 2024-12-03 NOTE — PHYSICAL EXAM
[General Appearance - Alert] : alert [General Appearance - In No Acute Distress] : in no acute distress [Sclera] : the sclera and conjunctiva were normal [Auscultation Breath Sounds / Voice Sounds] : lungs were clear to auscultation bilaterally [Heart Sounds] : normal S1 and S2 [Heart Sounds Gallop] : no gallops [Heart Sounds Pericardial Friction Rub] : no pericardial rub [Bowel Sounds] : normal bowel sounds [Abdomen Soft] : soft [Abdomen Tenderness] : non-tender [] : no hepato-splenomegaly [Abdomen Mass (___ Cm)] : no abdominal mass palpated [Cervical Lymph Nodes Enlarged Posterior Bilaterally] : posterior cervical [Cervical Lymph Nodes Enlarged Anterior Bilaterally] : anterior cervical [Supraclavicular Lymph Nodes Enlarged Bilaterally] : supraclavicular [Abnormal Walk] : normal gait [Nail Clubbing] : no clubbing  or cyanosis of the fingernails [Musculoskeletal - Swelling] : no joint swelling seen [No Focal Deficits] : no focal deficits [Oriented To Time, Place, And Person] : oriented to person, place, and time [Impaired Insight] : insight and judgment were intact [FreeTextEntry1] : changes in hands/arms c/w scleroderma but no new lesions and thickening not worse

## 2024-12-03 NOTE — HISTORY OF PRESENT ILLNESS
[___ Month(s) Ago] : [unfilled] month(s) ago [FreeTextEntry1] : Functionally stable - has been walking quite a bit and also exercising on a stationary bike at home. Denies any worsening dyspnea or cough from baseline. Notices more burning sensation and white color change in the finger tips in the cold, no digital ulcers.   Has some myalgias and arthralgia - L knee, bilateral shoulders. No swelling or morning stiffness. Some fatigue +

## 2025-02-10 NOTE — PHYSICAL EXAM
[TextEntry] : GENERAL: awake, NAD   HEENT: NCAT   NECK: supple, no LAD  CARDIAC: RRR, S1, S2 present   LUNGS: CTA b/l, comfortable respirations on room air   ABD: Soft, NT, ND   EXT: warm, well-perfused, no edema   SKIN: No lesions noted. Dry and warm

## 2025-02-10 NOTE — HISTORY OF PRESENT ILLNESS
[FreeTextEntry1] : Patient is a 42F with no prior hx of DM, seronegative systemic sclerosis/scleroderma, HFrEF/NICM, CRT-D (6/2024), VT s/p ablation 6/24, here for hospital discharge follow up.  Pt was admitted to [facility] and s/p open heart transplant on 1/16/2025. Pt was started on methylprednisolone taper, transitioned to prednisone.  Endocrine history: Prior to hospitalization, no hx of preDM or DM She reports she was on steroids for some time last year by her rheumatologist at which time she was told once that her glucose level was a bit high. Pt was on steroids on/off in 2024, but resumed after heart transplant on 1/2025.  For steroid induced hyperglycemia, pt was discharged on 2/6/25 on lantus pen 8u QHS and Tradjenta 5mg daily. Pt remains on prednisone 12.5mg BID. HbA1c 5.9% today FS log- checks twice daily. Low 100's in fasting and mid-high 100's bedtime. Denies hypoglycemia episodes.  Labs: 2/10/25- eGFR 42  SHx: Denies smoking or ETOH FHx: Denies DM

## 2025-02-10 NOTE — ASSESSMENT
[FreeTextEntry1] : Patient is a 42F with no prior hx of DM, seronegative systemic sclerosis/scleroderma, HFrEF/NICM, CRT-D (6/2024), VT s/p ablation 6/24, here for hospital discharge follow up.  # Steroid-induced hyperglycemia - No prior hx of diabetes - FS log reviewed- fasting appropriate <130 and prandial up to 180s, - HbA1c 5.9%, adequate overall - Remains on prednisone 12.5mg BID - Continue with current regimen: Lantus 8 units qhs and tradjenta 5mg daily and monitor FS twice daily - Advised to decrease lantus to 4 units bedtime if fasting FS <90 x2 consecutive days and notify clinic  RTC 3 months

## 2025-02-12 NOTE — REVIEW OF SYSTEMS
[Recent Weight Loss (___ Lbs)] : recent [unfilled] ~Ulb weight loss [Negative] : Heme/Lymph [Fever] : no fever [Chills] : no chills [Feeling Tired] : not feeling tired [Limb Pain] : no limb pain [Limb Swelling] : no limb swelling [FreeTextEntry2] : +weak [FreeTextEntry9] : +soreness over svetlana incsion site

## 2025-02-12 NOTE — HISTORY OF PRESENT ILLNESS
[Post-hospitalization from ___ Hospital] : Post-hospitalization from [unfilled] Hospital [Admitted on: ___] : The patient was admitted on [unfilled] [Discharged on ___] : discharged on [unfilled] [Discharge Summary] : discharge summary [Pertinent Labs] : pertinent labs [Discharge Med List] : discharge medication list [Med Reconciliation] : medication reconciliation has been completed [Patient Contacted By: ____] : and contacted by [unfilled] [FreeTextEntry2] : Hospital Course This is a 42 year-old female with pmhx of seronegative systemic sclerosis/scleroderma (Dx 2022 via skin biopsy), HFrEF/NICM (EF 15%, LVEDD 5.1 cm; Dx 5/24; 2/2 systemic sclerosis confirmed by cardiac biopsy) s/p CRT-D (6/2024) as secondary prevention, VT s/p ablation (6/24/24), pericardial effusion and Raynaud's presents for RHC after she had VT degenerating into Vfib resulting in a shock while she was asleep. s/p RHC showing elevated filling pressures and low cardiac index. Patient is currently undergoing evaluation for transplant. s/p OHT on 1/16/25 donor GP Cocci in clusters, blood (2/2) Staph lugdenensis mecA/C+). Postoperative hemorrhage requiring multiple transfusions overnight 1/17 S/P Exploration of mediastinum by median sternotomy approach with chest closure clot evacuated Extubated POD #2 and IABP removed Persistently hypotensive requiring Midodrine and Fludrocortisone. EDER for which cardiorenal consulted. Pleural effusion, s/p pigtail placement on 1/23 RHC/EMBx on 1/28 which revealed elevated filling pressures. Bx demonstrates mild rejection. 1/30 right pigtail removed. Pt transferred to [address] [place] [address]  1/31 VSS PAF x 2 yesterday --EP called. TTE NL LVF and RVF. no pericardial effusion. Orthostatics - negative. Bumex decreased 2mg po bid/dc metolazone. CXR small left effusion tentatively dc planning for Monday 2/3. 2/1 VSS; EKG with NSR. 7 min of -130's noted on Telemetry. Potassium supplemented. Per Heart Transplant increase Bumex to 4 mg PO BID and Metolazone 10 mg PO x 1. 2/2 VSS Metolazone x 1 Dose Tac Home this week 2/3 Stable hemodynamics  this am Cardiorenal following, continue with current medication regimen on Bumex 4 mg PO BID. Possible DC Tuesday 2/4 VSS BUN/creatine rising 103/2,27 cardiorenal- appreciate recs surgical bra for support tacro 11 lr @ 5- cchrx10 2/5 VSS bun /creatinine 109/2.13 cardioorenal following - ECHO -biopsy today 2/6 VSS bun/ creatine 110/2.06- Renal US wnl- cleared by cardiorenal for discharge- plan as per Transplant team - home with Zio patch

## 2025-02-12 NOTE — ASSESSMENT
[FreeTextEntry1] : Ms. Hernandez is a 42-year-old female with seronegative systemic sclerosis ( +LELAND) confirmed by skin bx 2022, HFrEF/NICM (EF 15%) sec to presumed systemic sclerosis underwent OHT on 1/16/25.     EDER:  Records indicated serum creatinine ranging less than 1 mg/dl as an outpatient up until her heart transplant. Post op  with some increase in creatinine likely due to surgery/hemodynamic changes which stabilized to the 1.1 mg/dl range. Since late 1/21 she had progressive worsening in her kidney function with peak serum creatinine of  1.7 mg/dl 1/24-1/25. This was assumed ed to be likely in the setting of volume shifts/Diuresis/as well as elevated Prograf level. Also, with urine protein elevated at 0.7 gm . From January 30 to February 4 the creatinine continued to rise from 1.6 mg/dl to a peak creatinine of 2.2 on Feb 4th.  Of note her tacrolimus levels during this time remained elevated ranging between 11-15 . On the day of discharge , February 6 her BUN and creatinine were 110/2 milligrams per deciliter.    I suspect that her EDER is largely driven by volume shifts, diuresis and elevated tacrolimus level.  She does have underlying systemic sclerosis so possibly due to that she might be more sensitive to   tacrolimus.  Today her creatinine has improved down to 1.5 although her BUN remains elevated in the 100 range.  The disproportionate rise in her BUN is likely due to diuresis as well as the prednisone.  Given that her  volume status has improved significantly.  Would consider decreasing the diuretics to once a day dosing and subsequently stopping it.  Will discuss with heart failure.  She has no symptoms of uremia repeat urine studies noted. Minimal albuminuria present If she was to develop another episode of EDER would have a low threshold for biopsying her Discussed with her in detail  Total Time Spent today on encounter 30 minutes. >50% time spent in counseling and coordination of care and on addressing above medical conditions in assessment. All labs, imaging, consulting reports, and any relevant outside records including laboratory work personally reviewed in order to evaluate, manage, and coordinate care amongst providers

## 2025-02-12 NOTE — PHYSICAL EXAM
[General Appearance - Alert] : alert [General Appearance - In No Acute Distress] : in no acute distress [Examination Of The Oral Cavity] : the lips and gums were normal [Oropharynx] : the oropharynx was normal [Neck Appearance] : the appearance of the neck was normal [Jugular Venous Distention Increased] : there was no jugular-venous distention [] : no respiratory distress [Respiration, Rhythm And Depth] : normal respiratory rhythm and effort [Exaggerated Use Of Accessory Muscles For Inspiration] : no accessory muscle use [Auscultation Breath Sounds / Voice Sounds] : lungs were clear to auscultation bilaterally [Heart Rate And Rhythm] : heart rate was normal and rhythm regular [Heart Sounds] : normal S1 and S2 [Edema] : there was no peripheral edema [Bowel Sounds] : normal bowel sounds [Abdomen Soft] : soft [No CVA Tenderness] : no ~M costovertebral angle tenderness [Involuntary Movements] : no involuntary movements were seen [No Focal Deficits] : no focal deficits [Oriented To Time, Place, And Person] : oriented to person, place, and time [Impaired Insight] : insight and judgment were intact [Affect] : the affect was normal [FreeTextEntry1] : ? S3 gallop

## 2025-02-12 NOTE — REVIEW OF SYSTEMS
[Fatigue] : fatigue [Lower Ext Edema] : lower extremity edema [Anxiety] : anxiety [Negative] : Gastrointestinal [Cough] : no cough [Dyspnea on Exertion] : no dyspnea on exertion [Constipation] : no constipation [Diarrhea] : diarrhea [Suicidal] : not suicidal [Insomnia] : no insomnia [Depression] : no depression [FreeTextEntry5] : better now with Buminex , mild chest wall tenderness

## 2025-02-12 NOTE — ASSESSMENT
[FreeTextEntry1] : S/P OHT 1/16/25  -Patient admitted for RHC after VT degenerating into Vfib resulting in shock while sleep. Now s/p OHT 01/16/25. cardio Scheduled Appointment: -ECHO on Thursday 2/13 at 9:45 am. Will follow up with Transplant Coordinator on the same day,  Scheduled Appointment: 02/06/2025 EPS -Scheduled Appointment: 03/03/2025  EDER- cr improved from discharge 1.5 2/10/25  -Saw Nephro 2/11/25   Liver Biopsy 1/9/25-  Liver biopsy - Liver parenchyma with mild sinusoidal dilatation/ congestion and minimal portal - Fibrosis Stage 2A/4 -saw hepatologist 8/7/24 - did Fibroscan 8/2024 and 10/24 -   Systemic Scleroderma affecting Myocardium -s/p OHT 1/16/25  to see  Rheum -RHEUM [address] [place] [address] Scheduled Appointment: 02/21/2025 -CT biopsy liver 1/9/25 Fibrosis stage 2  -on mycophenolate 3Gram daily o-on  prednisone 12 BID started Cellcept 5/15/24 on 3000 qd ( 1500 BID ) -02/19/2024 CT chest -IMPRESSION: Clear lungs. No CT evidence for interstitial lung disease.  Thyroid US Findings and Treatment: FINDINGS:IMPRESSION: Left thyroid lobe isoechoic nodule without significant change (TI-RADS 3). Follow-up recommended. Scheduled Appointment: 02/10/2025 with endocrinology   Skin eczema- hyperpigmentation generalized dx as scleroderma -improving in skin pigmentation after heart transplant and immunosuppressive rx  -PAth 4/21/24-Final Diagnosis Left upper arm, punch biopsy - Altered collagen, consistent with scleroderma -followed by dermatologist -bx records 12/21 reviewed - saw second opnion derm 7/21/22 -rheum consult reviewed -adv to see Endo again hx low cortison levle 10/23 - r/o addisions dz has appt in 10.2024 adv to see someone early  Dyslipidemia-  - educated patient low-fat diet aerobic exercises and weight loss. We'll check lipid panel today.  CTS- b/l hands -saw otho 2019 got cortisone shots rt hand 2019 - got better - Left hand started 2021 - saw ortho got cortisone shot no wbetter - saw neurologist also - continue wrist brace  MORBID OBESITY/PCOS -BMI 34 now -->33 --> 32-->26 lost a lot of water weight after transplant  -hx infertility -saw Infertility doctor -lost 70 lbs 2018 - had son 2019 natural  DM- AIc 5.9 stable 2/2025- hyperglycemia due to steroid use - on lantus 8 U qhs and Trajenta 5 followed by sherrell whitaker f/u 4/2025  -checking sugars BID   Vitamin D deficiency. on 50,000 vit D q weekly , Continue supplements  Health maintenance Pap smear- 2019 - advised pending - referral placed mammo due -ordered again Tetanus vaccine-5/2019 Pneumovax refused Flu vaccine--10/10/2024 Pfizer -4/13/21, 5/4/21. Booster advised.

## 2025-02-12 NOTE — HISTORY OF PRESENT ILLNESS
[FreeTextEntry1] : Ms. Hernandez is a 42 year old female with seronegative systemic sclerosis ( +LELAND) confirmed by skin bx 2022, HFrEF/NICM (EF 15%) sec to presumed systemic sclerosis Med[employer] CRT-D as secondary prevention 6/24, VT s/p ablation 6/24, pericardial effusion, Raynaud's syndrome who had VT and ICD fire on Sat 1/4/25 underwent OHT on 1/16/25.  Her postop course was significant for high transfusion requirement and return to OR on 1/17 for exploration.   During her hospitalization, she developed EDER.  Prior to her heart transplant she had no known history of any kidney disease  Records indicated serum creatinine ranging less than 1 mg/dl as an outpatient up until her heart transplant. Post op  with some increase in creatinine likely due to surgery/hemodynamic changes which stabilized to the 1.1 mg/dl range. Since late 1/21 she had progressive worsening in her kidney function with peak serum creatinine of  1.7 mg/dl 1/24-1/25. This was assumed ed to be likely in the setting of volume shifts/Diuresis/as well as elevated Prograf level. Also, with urine protein elevated at 0.7 gm   She underwent rt heart cath which revealed increased filling pressures/Mod TR/mildly reduced RV dysfunction Renal Duplex: No GRIFFIN, Patent renal veins Urine Lytes suggestive of prerenal indices.? related to tacro and underlying scleroderma.  LDH/Haptoglobin normal.   She also developed A fib in the hospital possibly in the setting of volume overload.  She was initiated again on diuretics in the setting of significant volume overload. From January 30 to February 4 the creatinine continued to rise from 1.6 mg/dl to a peak creatinine of 2.2 on Feb 4th.  Of note her tacrolimus levels during this time remained elevated ranging between 11-15 . On the day of discharge , February 6 her BUN and creatinine were 110/2 milligrams per deciliter.    She had undergone RHC/Bx on 1/28/2025 with 1R/2 and repeat. RHC/Bx on 2/5/25 with 0R.  She was discharged on tapering doses of prednisone  She comes for a follow-up visit today .  She is due for repeat ECHO later this week Still on diuretics but her swelling has resolved.  Her blood pressure is in the normal range weight is down 140.5 pounds (discharge weight last week was 152 pounds) She complains of soreness near the incision Her appetite is good No fevers Currently on prednisone 12.5 bid prednisone (since dc 2/6) and  Bumex 4 mg twice daily

## 2025-02-14 NOTE — PHYSICAL EXAM
[No Edema] : no edema [Normal] : alert and oriented, normal memory [de-identified] : jvd < 6cm [de-identified] : +midsternal chest wound incision with steristrips intact, dry; +MCOT in place left side chest

## 2025-02-14 NOTE — PHYSICAL EXAM
[No Edema] : no edema [Normal] : alert and oriented, normal memory [de-identified] : jvd < 6cm [de-identified] : +midsternal chest wound incision with steristrips intact, dry; +MCOT in place left side chest

## 2025-02-14 NOTE — CARDIOLOGY SUMMARY
[de-identified] : 1/24/25: EF 75%, no significant changes 1/21/25: LVIDs 2.5 cm, EF normal, RV normal size/fn, mild TR, no effusion, IVC 1.9 cm  [de-identified] : 2/5 RHC/EMBx: RA 12, PA 37/13/21, PCWP 11, CO/CI 5.45/3.18, Grade 0R, C4d negative  1/28 RHC/EMBx: RA 20, PA 36/18/24, PCWP 16, AO sat 100%, PA sat 52.3, Grade 1R/2, C4d negative

## 2025-02-14 NOTE — DISCUSSION/SUMMARY
[FreeTextEntry1] : #s/p OHT 1/16/25 - Post op hemorrhage, multiple transfusions s/p return to OR on 1/17 for clot evacuation and chest closure. CDC Crossmatch negative, however, DPB1 at 3K at time of transplant.   - DSAs 1/16 DP1 3913 mfi (time of transplant), 1/28 DP1 3644 mfi, 1/30 DP1 6090 mfi - repeat TTE 1/21 with grossly normal graft function, mild TR, stable on repeat TTE 1/24, follow up TTE today - s/p RHC/EMBx on 2/5, ISHLT Grade 0R, C4d negative - c/w pravachol 40mg daily - consider starting ASA after next biopsy - Diuretics: bumex 4mg BID; will reduce to 2mg daily now - This patient has a heart allograft and is at risk for rejection through immune system recognition of non-self tissue. AlloMap and AlloSure and noninvasive tests ordered to evaluate for the probability of rejection after pretest and assist in immunosuppression management. Studies have shown that these tests can help to rule out rejection without subjecting the patient to the bleeding, arrhythmia, and structural damage risks of invasive endomyocardial biopsies. The AlloMap and AlloSure tests help guide clinical decision making for this patient's surveillance schedule and immunosuppression adjustments.  #immunosuppression - c/w cellcept 1gm bid - c/w prograf for goal 10-12, current dose 5mg bid, last level = 12.7 - c/w prednisone taper per protocol, currently on 12.5mg bid (last reduced 2/6)  #prophylaxis abx - CMV +/+: continue valcyte 450mg qod, may need to adjust for renal function now that it's improving. Check CMV PCR with next set of labs - toxo -/-: continue atovaquone - candida ppx: continue nystatin s&S qid - Donor GP Cocci in clusters, blood (2/2) - Staph lugdenensis mecA/C+: IV Vanco switched to Cefazolin. Completed course of abx on 1/31  #afib, now NSR - c/w MCOT - f/u with EP  # Systemic sclerosis, Diffuse systemic sclerosis (LELAND positive all other serologies negative), skin bx consistent with Scl, - myocardial biopsy c/w scleroderma - continue current IS  - Monitor for scleroderma renal crisis while using high dose steroids. Monitor Scr, U/A and urine protein/Cr in setting of steroid use  - rheumatology follow up outpt  #EDER post op - continue to trend BUN/SCr, now downtrending Scr 1.5 down from 2.0,  down from 110 - adjust diuretics as above - renal US unremarkable - f/u with nephrocards, appreciate recs  #hyperglycemia related to steroids - continue with lantus and tradjenta - f/u with endocrine  #health maintenance - MVI, PPI - encouraged walking program  Time spent with pt 35 min Findings, assessment and plan reviewed with Dr Schreiber RTC/TTE/Bx one week

## 2025-02-14 NOTE — HISTORY OF PRESENT ILLNESS
[FreeTextEntry1] : 42F w/ seronegative systemic sclerosis/scleroderma (Dx 2022 via skin biopsy), HFrEF/NICM (EF 15%, LVEDD 5.1 cm; Dx 5/24; 2/2 systemic sclerosis confirmed by cardiac biopsy) s/p CRT-D (6/2024) as secondary prevention, VT s/p ablation (6/24/24), pericardial effusion and Raynaud's who is now s/p heart transplant on 1/14/25 (total ischemic time 233 min, CMV +/+ toxo -/-. donor GP Cocci in clusters, blood (2/2) - Staph lugdenensis mecA/C+).  Pre transplant course: who has an open evaluation for VAD/transplant (ABO B) who presents for RHC after she had VT degenerating into Vfib resulting in a shock while she was asleep. She has been functional but reports ~10 lb weigh gain over the past few weeks with poor appetite. Her RHC is notable for elevated BiV filling pressures with a low CO/CI and elevated PVR. She is undergoing AT eval given her advanced CM. Patient was listed for OHT on 1/14/25.  A suitable donor was identified and she's now s/p OHT on 1/16/25 (total ischemic time 233 min, CMV +/+ toxo -/-. donor GP Cocci in clusters, blood (2/2) - Staph lugdenensis mecA/C+). Postoperative complications included hemorrhage requiring multiple transfusions overnight s/p return to OR on 1/17 for clot evacuation and chest closure. She was extubated POD #2 and IABP removed as well.  was weaned off however she was noted to be persistently hypotensive requiring Midodrine and Fludrocortisone, now off and SBP have been ranging 100-110. Postop was noted to have worsening EDER for which cardiorenal is following. She underwent RHC/EMBx on 1/28 which revealed elevated filling pressures and her diuretics have been adjusted. At this time her volume status has improved though she still has some pitting edema near her ankles however she continues to have elevated renal function with BUN currently 110. Per Nephrology patient has been cleared to be discharged and if renal function continues to arise as outpatient may need to consider renal bx. Her course was further complicated by pleural effusion, s/p pigtail placement on 1/23-1/30. She was also noted to have run of Afib and converted to SR. EP was consulted and she will be discharged with Ziopatch in place. Pt discharged home on 2/10/25.  Pt here today for first outpatient visit since being discharged home. She is accompanied by her . Since being home, she reports she has been feeling better and getting stronger. She is walking up 13 steps and ambulating around her home. Her body is healing, she still sometimes has some intermittent muscular pain in her chest without any issues breathing or palpitations.  Home -126/78-87. -198 mg/dL taking insulin as directed.  Moving bowels normally without diarrhea. Appetite is fair and she is trying to increase her protein intake. She has lost 7 lbs since being home. Sleeping well. No issues with taking medications and taking everything as directed. Denied SOB, CP, palpitations, N/V or LE edema.

## 2025-02-14 NOTE — ASSESSMENT
[FreeTextEntry1] : 43 y/o F with scleroderma, HFrEF, who was admitted after VFib and ICD shock. RHC with markedly elevated filling pressures and low cardiac index. Completed heart transplant evaluation and was listed for OHT. Now s/p OHT on 1/16/2025 (total ischemic time 233 min, CMV +/+, toxo -/-). Post-op hemorrhage requiring transfusions and return to OR. Post-op hypotension treated with Midodrine and Fludrocortisone, now off. Now with EDER with improving renal function. Clinically improving and here today for outpatient follow up.

## 2025-02-14 NOTE — CARDIOLOGY SUMMARY
[de-identified] : 1/24/25: EF 75%, no significant changes 1/21/25: LVIDs 2.5 cm, EF normal, RV normal size/fn, mild TR, no effusion, IVC 1.9 cm  [de-identified] : 2/5 RHC/EMBx: RA 12, PA 37/13/21, PCWP 11, CO/CI 5.45/3.18, Grade 0R, C4d negative  1/28 RHC/EMBx: RA 20, PA 36/18/24, PCWP 16, AO sat 100%, PA sat 52.3, Grade 1R/2, C4d negative

## 2025-02-18 NOTE — CARDIOLOGY SUMMARY
[de-identified] : 2/18/25: HR 90bpm [de-identified] : 2/18/25: pending 1/24/25: EF 75%, no significant changes 1/21/25: LVIDs 2.5 cm, EF normal, RV normal size/fn, mild TR, no effusion, IVC 1.9 cm  [de-identified] : 2/18/25 RHC/EMBx #3: RA 10, PA 36/12/21, PCWP 17, CO/CI 5/3.12, Grade  2/5/25 RHC/EMBx #2: RA 12, PA 37/13/21, PCWP 11, CO/CI 5.45/3.18, Grade 0R, C4d negative  1/28/25 RHC/EMBx #1: RA 20, PA 36/18/24, PCWP 16, AO sat 100%, PA sat 52.3, Grade 1R/2, C4d negative

## 2025-02-18 NOTE — HISTORY OF PRESENT ILLNESS
[FreeTextEntry1] : 42F w/ seronegative systemic sclerosis/scleroderma (Dx 2022 via skin biopsy), HFrEF/NICM (EF 15%, LVEDD 5.1 cm; Dx 5/24; 2/2 systemic sclerosis confirmed by cardiac biopsy) s/p CRT-D (6/2024) as secondary prevention, VT s/p ablation (6/24/24), pericardial effusion and Raynaud's who is now s/p heart transplant on 1/14/25 (total ischemic time 233 min, CMV +/+ toxo -/-. donor GP Cocci in clusters, blood (2/2) - Staph lugdenensis mecA/C+).  Pre transplant course: who has an open evaluation for VAD/transplant (ABO B) who presents for RHC after she had VT degenerating into Vfib resulting in a shock while she was asleep. She has been functional but reports ~10 lb weigh gain over the past few weeks with poor appetite. Her RHC is notable for elevated BiV filling pressures with a low CO/CI and elevated PVR. She is undergoing AT eval given her advanced CM. Patient was listed for OHT on 1/14/25.  A suitable donor was identified and she's now s/p OHT on 1/16/25 (total ischemic time 233 min, CMV +/+ toxo -/-. donor GP Cocci in clusters, blood (2/2) - Staph lugdenensis mecA/C+). Postoperative complications included hemorrhage requiring multiple transfusions overnight s/p return to OR on 1/17 for clot evacuation and chest closure. She was extubated POD #2 and IABP removed as well.  was weaned off however she was noted to be persistently hypotensive requiring Midodrine and Fludrocortisone, now off and SBP have been ranging 100-110. Postop was noted to have worsening EDER for which cardiorenal is following. She underwent RHC/EMBx on 1/28 which revealed elevated filling pressures and her diuretics have been adjusted. At this time her volume status has improved though she still has some pitting edema near her ankles however she continues to have elevated renal function with BUN currently 110. Per Nephrology patient has been cleared to be discharged and if renal function continues to arise as outpatient may need to consider renal bx. Her course was further complicated by pleural effusion, s/p pigtail placement on 1/23-1/30. She was also noted to have run of Afib and converted to SR. EP was consulted and she will be discharged with Ziopatch in place. Pt discharged home on 2/10/25.  Pt here today for RHC/EMBx #3. She is accompanied by her . Since being home, she reports she has been feeling better and getting stronger. She is walks up and down her 13 steps multiple times a day, is ambulating around her home and started walking ~15 minutes daily on a treadmill. She still sometimes has some intermittent sternal incision discomfort but this is improving overall. She denies chest pain, palpitations, dizziness/lightheadedness or palpitations. Home -120/80-90's. -180 mg/dL taking insulin as directed.  Moving bowels normally without diarrhea. Denies N/V/D. Appetite is fair and she is trying to increase her protein intake. Since diuretics were adjusted last week, she has lost an additional 1lb and has no lower extremity edema or SOB.  Sleeping well. No issues with taking medications and taking everything as directed.

## 2025-02-18 NOTE — DISCUSSION/SUMMARY
[FreeTextEntry1] : #s/p OHT 1/16/25 - Post op hemorrhage, multiple transfusions s/p return to OR on 1/17 for clot evacuation and chest closure. CDC Crossmatch negative, however, DPB1 at 3K at time of transplant.   - DSAs 1/16 DP1 3913 mfi (time of transplant), 1/28 DP1 3644 mfi, 1/30 DP1 6090 mfi - repeat TTE 1/21 with grossly normal graft function, mild TR, stable on repeat TTE 1/24, follow up TTE today - s/p RHC/EMBx on 2/5, ISHLT Grade 0R, C4d negative - c/w pravachol 40mg daily - consider starting ASA after next biopsy - Diuretics: bumex 4mg BID; will reduce to 2mg daily now - This patient has a heart allograft and is at risk for rejection through immune system recognition of non-self tissue. AlloMap and AlloSure and noninvasive tests ordered to evaluate for the probability of rejection after pretest and assist in immunosuppression management. Studies have shown that these tests can help to rule out rejection without subjecting the patient to the bleeding, arrhythmia, and structural damage risks of invasive endomyocardial biopsies. The AlloMap and AlloSure tests help guide clinical decision making for this patient's surveillance schedule and immunosuppression adjustments.  #immunosuppression - c/w cellcept 1gm bid - c/w prograf for goal 10-12, current dose 5mg bid, last level = 12.7 - c/w prednisone taper per protocol, currently on 12.5mg bid (last reduced 2/6)  #prophylaxis abx - CMV +/+: continue valcyte 450mg qod, may need to adjust for renal function now that it's improving. Check CMV PCR with next set of labs - toxo -/-: continue atovaquone - candida ppx: continue nystatin s&S qid - Donor GP Cocci in clusters, blood (2/2) - Staph lugdenensis mecA/C+: IV Vanco switched to Cefazolin. Completed course of abx on 1/31  #afib, now NSR - c/w MCOT - f/u with EP - has appt w/ Dr Umana 3/3  # Systemic sclerosis, Diffuse systemic sclerosis (LELAND positive all other serologies negative), skin bx consistent with Scl, - myocardial biopsy c/w scleroderma - continue current IS  - Monitor for scleroderma renal crisis while using high dose steroids. Monitor Scr, U/A and urine protein/Cr in setting of steroid use  - rheumatology follow up outpt - has appt 2/21  #EDER post op - continue to trend BUN/SCr, now downtrending Scr 1.5 down from 2.0,  down from 110 - adjust diuretics as above - renal US unremarkable - f/u with nephrocards, appreciate recs  #hyperglycemia related to steroids - continue with lantus and tradjenta - f/u with endocrine  #health maintenance - MVI, PPI - encouraged walking program  Time spent with pt 35 min Findings, assessment and plan reviewed with Dr Schreiber RTC/TTE/Bx one week

## 2025-02-18 NOTE — PHYSICAL EXAM
[No Edema] : no edema [Normal] : alert and oriented, normal memory [de-identified] : jvd < 6cm [de-identified] : +midsternal chest wound incision with steristrips intact, dry; +MCOT in place left side chest

## 2025-02-21 NOTE — HISTORY OF PRESENT ILLNESS
[___ Month(s) Ago] : [unfilled] month(s) ago [FreeTextEntry1] : Rheumatology Discharge [facility] 2/2025: #Diffuse systemic sclerosis (LELAND positive all other serologies negative), skin bx consistent with Scl. #Cardiac involvement of scleroderma -- myocardial biopsy c/w scleroderma, HFrEF (EF 4%), pericardial effusion, and VT s/p ICD #S/p OHT on 1/16. #EDER likely 2/2 over diuresis. - Admitted for VT/VF and ICD shock, s/p RHC with elevated filling pressures and low cardiac index. - Was on home cellcept 1.5 g BID - Now s/p OHT on 1/16; on steroids, cellcept, and tacrolimus as per primary team - SCr noted to increase to 1.5 from 1.1, U/A small blood 30 protein, suspect possibly medication induced EDER Recommendations: - C/w immunosuppressives as per transplant team - Closely monitor SCr, U/A, and urine protein/Cr in setting of steroid use - Will need f/u with Dr. Ursula White on discharge.

## 2025-02-21 NOTE — HISTORY OF PRESENT ILLNESS
[___ Month(s) Ago] : [unfilled] month(s) ago [FreeTextEntry1] : Rheumatology Discharge [facility] 2/2025: #Diffuse systemic sclerosis (LELAND positive all other serologies negative), skin bx consistent with Scl. #Cardiac involvement of scleroderma -- myocardial biopsy c/w scleroderma, HFrEF (EF 4%), pericardial effusion, and VT s/p ICD #S/p OHT on 1/16. #EDER likely 2/2 over diuresis. - Admitted for VT/VF and ICD shock, s/p RHC with elevated filling pressures and low cardiac index. - Was on home cellcept 1.5 g BID - Now s/p OHT on 1/16; on steroids, cellcept, and tacrolimus as per primary team - SCr noted to increase to 1.5 from 1.1, U/A small blood 30 protein, suspect possibly medication induced EDER Recommendations: - C/w immunosuppressives as per transplant team - Closely monitor SCr, U/A, and urine protein/Cr in setting of steroid use - Will need f/u with Dr. Ursula White on discharge.

## 2025-02-21 NOTE — ASSESSMENT
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 Abdullahi \par --Started on weekly carbo/taxol weekly 3 on 1 off on 8/14/2020 \par --PET CT reviewed from 9/8/20. \par --Lab work today with Stable blood counts\par --For chemo 10/16/2020\par --Will require PICC line placement, unable to stop AC at this time for port 2/2/ recent PE\par \par Malignant ascites required high volume paracentesis \par --Improved/resolved with carbo/taxol \par \par PE in the setting of malignancy diagnosed on 8/6/2020\par --Doppler was negative for DVT on 8/8/2020\par --On BID Lovenox, but ran out on 9/1/2020\par --Also on ASA, h/o CABG . \par --Was stared on Xarelto, but after 1 dose was admitted with recurrent PE, diagnosed on 9/11/2020\par --Contique on Xarelto \par \par Follow up in 3 weeks or earlier if needed.\par  [FreeTextEntry1] : Systemic sclerosis (LELAND positive all other serologies negative), skin bx consistent with Scl. Recently dx HFrEF (EF%25), pericardial, and pleural effusion  1. Systemic sclerosis based on skin biopsy: has skin tightening up to the MCPs bilaterally, salt and pepper pigmentation of the skin, small oral aperture, cardiomyopathy with HFrEF (EF%25), pericardial, and pleural effusion, Raynaud's with acro-osteolysis, arthralgia.  i) Skin: skin tightening up to the MCPs bilaterally, salt and pepper pigmentation of the skin, no telangiectasias. Raynaud's symptoms with acro-osteolysis however, BP on the lower end 80s/60s which precludes use of most vasodilatory therapies. Discussed a trial of ASA 81 mg daily during winter if no contraindication per cardiology (patient denies any active GERD, PUD or GI bleed, epistaxis history). Offered SNRI but she is hesitant to try them at this time. Advised conservative management with hand warmers and avoiding triggers.   ii) MSK: has arthralgia in the knees and shoulders. Manageable for now but can consider adding Plaquenil should the symptoms become persistent.  iii) Pulmonary: No ILD per CT chest in Feb 2024. Referred to repeat HRCT Chest and baseline PFTs ordered.  iv) Cardiac:  hospitalized June 2024 for ADHF and VT. RHC w/ elevated filling press & low CI requiring IABP for mechanical support & milrinone. AT evaluation launched as backup. Weaned off IABP and milrinone then had monomorphic VT requiring DCCV. Ultimately underwent VT ablation on 6/24 (on Eliquis for 30 days post-ablation). Endomyocardial biopsy also performed showing patchy interstitial fibrosis and myocyte hypertrophic changes consistent with scleroderma. Since discharge, device interrogation revealing frequent episodes of NSVT for which BB have been escalated. Interval TTE from 9/18 with ongoing pericardial effusion but no signs of tamponade and LVEF mildly improved. Currently on Entresto, Mexiletine, Bumetanide, Eplerenone, Metoprolol and Farxiga. Being considered for cardiac transplant.  v) GI: no active issues. Patient denies GERD. No h/o peptic ulcers, GAVE, gastroparesis, GI bleed. Occasional constipation. Has liver fibrosis - follow up MRE planned.  vi) Renal: No active issues  vii) Heme: no active issues  viii) Neuro: no active issues  Follow up in 6 weeks

## 2025-02-21 NOTE — DATA REVIEWED
[FreeTextEntry1] : Heart pathology (02/2025): 1  Right heart ventricle x4 2  Right heart ventricle x1 Final Diagnosis 1. Heart, transplant, right ventricle, biopsy       - No cellular rejection, ISHLT grade 0R - C4d immunostain is negative. 2. Heart, transplant, right ventricle, biopsy - Immunofluorescence for C4d is negative  CPET 11/8/2024: Protocol 10 WATT ramp, reason stopped: leg fatigue. Exercise time 8:45min. Conclusion: Abnormal CPET consistent with patient cardiomyopathy. The patient has Brito Class D cardiac failure based on the maximal oxygen uptake (peak VO2) of 8.9 ml/kg/min (pVO2<50% predicated is of greater concern in a young patient) Most markers of ventilatory efficiency were abnormal. While her cardiomyopathy is likely the proinent reason for her reduced exercise tolerance, there is also a component of deconditioning given the low markers of aerobic capacity (VO2 at AT, VO2/work slope, OUES) along with an elevated dyspnea index and exaggerated heart rate response to exercise. Of Note, the patient exhibited an oscillatory breathing pattern during early exercise which may be a marker of disease severity in heart failure patients. There are significant markers of impaired aerobic capacity and ventilatory efficiency which are particularly concerning in a young patient for poor outcomes. Would warrant an evaluation for advanced therapies. She also demonstrated an oscillatory breathing patter which may indicate an underlying sleep disorder and would recommend a sleep study. There is also a component of deconditioning and would benefit from cardiac rehab. A home exercise prescription plan will be provided to her.  Echo: TTE 9/18/24: LVEF 25% (global), mild RVE with moderately reduced function (TAPSE 1.4), normal LA, TRICIA, mild MR, mod TR, est PASP 25 mmHg, IVC normal size with normal inspiratory collapse, large circumferential pericardial effusion measuring 2.1 cm posterior/lateral to LV without clear signs of increased intrapericardial pressures  6/28/24 - EF 15%, LVEDD 5.1 cm) mod localized pericardial effusion posterior and lateral to LV; small pericardial effusion adjacent to RV; mod adjacent to RA; >60% respriatory variation across TV; no diastolic inversion of RV or diastolic collapse of RA; IVC not plethoric (1.9 cm)  6/9/24- LVEF 20%, LVIDd 5.8cm, LVPWd 0.9 cm, IVSd 0.9cm, LA 4.15cm, RV 11.20cm, RV reduced systolic function, moderate MR, PASP 44mmHg, IVC 2.22cm, moderate pericardial effusion adjacent to posterior and lateral left ventricle and right atrium, a trace pericardial effusion noted adjacent to the apex with no evidence of RV diastolic collapse.  5/9/24 - moderate pericardial effusion adjacent to the posterior left ventricle, moderate pericardial effusion adjacent to the lateral left ventricle and a small pericardial effusion adjacent to the right atrium with no evidence of hemodynamic compromise (or echocardiographic evidence of cardiac tamponade  5/3/24 - LVEF 19%, LVIDD 5.1cm, enlarged RV and decreased RV systolic function, mild MR, moderate pericardial effusion    CT/MRI: 5/10/24 - cardiac MRI - EF 13%; Enlarged right ventricle with associated hypokinesis. Normal left ventricular size with global left ventricular hypokinesis with sparing of the septum. Limited late gadolinium enhancement images. Apparent late gadolinium enhancement at the basal lateral myocardial that may represent artifact.    EP: 6/24/24 - VT ablation of LV septum with Repeat EP Study 6/26 unable to induce   Cardiac Cath/PCI: 6/20/24 - RHC/EMBx - BP missing; RA 9 (v 12), RV 52/10, PA 52/16/33, PCWP 15 (v 19), CO/CI 7.4/4.2; path - patchy interstital fibrosis and myocyte hypertrophic changes; no inflammatory infiltrate seen  5/9/24 - /68, RA 7, RV 43/7/13, PAP 42/16/29, PCWP 17 (v 24), TPG 12, CO/CI: 4.89/2.79 non-obstructive coronaries

## 2025-03-07 NOTE — CARDIOLOGY SUMMARY
[de-identified] : 3/3/25: sinus, low voltage, PRWP  2/18/25: HR 90bpm [de-identified] : 3/3/25: 2/18/25: EF normal 62%, normal RVSF, mod TR, no effusion, no significant changes 1/24/25: EF 75%, no significant changes 1/21/25: LVIDs 2.5 cm, EF normal, RV normal size/fn, mild TR, no effusion, IVC 1.9 cm  [de-identified] : 3/4/25 RHC/EMBx #4:  2/18/25 RHC/EMBx #3: RA 10, PA 36/12/21, PCWP 17, CO/CI 5/3.12, Grade 0R, C4d negative 2/5/25 RHC/EMBx #2: RA 12, PA 37/13/21, PCWP 11, CO/CI 5.45/3.18, Grade 0R, C4d negative  1/28/25 RHC/EMBx #1: RA 20, PA 36/18/24, PCWP 16, AO sat 100%, PA sat 52.3, Grade 1R/2, C4d negative

## 2025-03-07 NOTE — HISTORY OF PRESENT ILLNESS
[FreeTextEntry1] : Mr. Hernandez is a 43 y/o F w/ seronegative systemic sclerosis/scleroderma (Dx 2022 via skin biopsy), prior stage D HFrEF/NICM (EF 15%, LVEDD 5.1 cm; Dx 5/24; 2/2 systemic sclerosis confirmed by cardiac biopsy) s/p CRT-D (6/2024) as secondary prevention with prior VT s/p ablation (6/24/24) and pericardial effusion s/p heart transplant on 1/14/25 (total ischemic time 233 min, CMV +/+, Toxo -/-), post-transplant EDER (Cr peak at 2.2; now 1.4) who presents for f/u..  For details of initial HF course, please refer to note from 5/17/24 and 8/14/24.   Pre transplant course:  1/6/25-2/6/25 - presented for RHC after she had VT degenerating into Vfib resulting in a shock while she was asleep. Had elevated BiV filling pressures with a low CO/CI and elevated PVR. Had IABP placed and required inotropes. Was listed for OHT on 1/14/25. Underwent OHT 1/16/25 (total ischemic time 233 min, CMV +/+ toxo -/-. donor GP Cocci in clusters, blood (2/2) - Staph daniloenensis mecA/C+) with postoperative complications included hemorrhage requiring multiple transfusions overnight s/p return to OR on 1/17 for clot evacuation and chest closure. Noted to be hypotensive (preserved CI, low SVR) requiring Midodrine and Fludrocortisone which were weaned off. Had EDER (peak Cr 2.2) liang to 2.0.  Her course was further complicated by pleural effusion, s/p pigtail placement on 1/23-1/30. She was also noted to have run of Afib and converted to SR. EP was consulted and had Ziopatch.   Has been feeling well and back to baseline. She still sometimes has some intermittent sternal incision discomfort but this is improving overall. She denies chest pain, palpitations, dizziness/lightheadedness or palpitations. Home -120/80-90's. -180 mg/dL taking insulin as directed.  Moving bowels normally without diarrhea. Denies N/V/D. Appetite is fair and she is trying to increase her protein intake. Sleeping well. No issues with taking medications and taking everything as directed. Weight stable at 138 pounds and takes bumex daily with extra every other day

## 2025-03-07 NOTE — CARDIOLOGY SUMMARY
[de-identified] : 3/3/25: sinus, low voltage, PRWP  2/18/25: HR 90bpm [de-identified] : 3/3/25: 2/18/25: EF normal 62%, normal RVSF, mod TR, no effusion, no significant changes 1/24/25: EF 75%, no significant changes 1/21/25: LVIDs 2.5 cm, EF normal, RV normal size/fn, mild TR, no effusion, IVC 1.9 cm  [de-identified] : 3/4/25 RHC/EMBx #4:  2/18/25 RHC/EMBx #3: RA 10, PA 36/12/21, PCWP 17, CO/CI 5/3.12, Grade 0R, C4d negative 2/5/25 RHC/EMBx #2: RA 12, PA 37/13/21, PCWP 11, CO/CI 5.45/3.18, Grade 0R, C4d negative  1/28/25 RHC/EMBx #1: RA 20, PA 36/18/24, PCWP 16, AO sat 100%, PA sat 52.3, Grade 1R/2, C4d negative

## 2025-03-07 NOTE — PHYSICAL EXAM
[No Edema] : no edema [Normal] : alert and oriented, normal memory [de-identified] : jvd < 6cm [de-identified] : +midsternal chest wound incision with steristrips intact, dry; +MCOT in place left side chest

## 2025-03-07 NOTE — HISTORY OF PRESENT ILLNESS
[FreeTextEntry1] : Mr. Hernandez is a 41 y/o F w/ seronegative systemic sclerosis/scleroderma (Dx 2022 via skin biopsy), prior stage D HFrEF/NICM (EF 15%, LVEDD 5.1 cm; Dx 5/24; 2/2 systemic sclerosis confirmed by cardiac biopsy) s/p CRT-D (6/2024) as secondary prevention with prior VT s/p ablation (6/24/24) and pericardial effusion s/p heart transplant on 1/14/25 (total ischemic time 233 min, CMV +/+, Toxo -/-), post-transplant EDER (Cr peak at 2.2; now 1.4) who presents for f/u..  For details of initial HF course, please refer to note from 5/17/24 and 8/14/24.   Pre transplant course:  1/6/25-2/6/25 - presented for RHC after she had VT degenerating into Vfib resulting in a shock while she was asleep. Had elevated BiV filling pressures with a low CO/CI and elevated PVR. Had IABP placed and required inotropes. Was listed for OHT on 1/14/25. Underwent OHT 1/16/25 (total ischemic time 233 min, CMV +/+ toxo -/-. donor GP Cocci in clusters, blood (2/2) - Staph daniloenensis mecA/C+) with postoperative complications included hemorrhage requiring multiple transfusions overnight s/p return to OR on 1/17 for clot evacuation and chest closure. Noted to be hypotensive (preserved CI, low SVR) requiring Midodrine and Fludrocortisone which were weaned off. Had EDER (peak Cr 2.2) liang to 2.0.  Her course was further complicated by pleural effusion, s/p pigtail placement on 1/23-1/30. She was also noted to have run of Afib and converted to SR. EP was consulted and had Ziopatch.   Has been feeling well and back to baseline. She still sometimes has some intermittent sternal incision discomfort but this is improving overall. She denies chest pain, palpitations, dizziness/lightheadedness or palpitations. Home -120/80-90's. -180 mg/dL taking insulin as directed.  Moving bowels normally without diarrhea. Denies N/V/D. Appetite is fair and she is trying to increase her protein intake. Sleeping well. No issues with taking medications and taking everything as directed. Weight stable at 138 pounds and takes bumex daily with extra every other day

## 2025-03-07 NOTE — ASSESSMENT
[FreeTextEntry1] : 41 y/o F with scleroderma, HFrEF, who was admitted after VFib and ICD shock. RHC with markedly elevated filling pressures and low cardiac index. Completed heart transplant evaluation and was listed for OHT. Now s/p OHT on 1/16/2025 (total ischemic time 233 min, CMV +/+, toxo -/-). Post-op hemorrhage requiring transfusions and return to OR. Post-op hypotension treated with Midodrine and Fludrocortisone, now off. Now with EDER with improving renal function. Clinically improving and here today for outpatient follow up.

## 2025-03-07 NOTE — DISCUSSION/SUMMARY
[FreeTextEntry1] : #s/p OHT 1/16/25 - Post op hemorrhage, multiple transfusions s/p return to OR on 1/17 for clot evacuation and chest closure. CDC Crossmatch negative, however, DPB1 at 3K at time of transplant.   - Echo today w/ normal graft function - CAV ppx: Started ASA 81mg daily 2/28, continue Pravachol 40mg daily - RHC with elevated filling pressures on diuresis - Diuretics: increase Bumex 2mg daily to Bumex 2mg BID QOD - DSAs 1/16 DP1 3913 mfi (time of transplant), 1/28 DP1 3644 mfi, 1/30 DP1 6090 mfi; continue to trend (send with each Bx) - This patient has a heart allograft and is at risk for rejection through immune system recognition of non-self tissue. AlloMap and AlloSure and noninvasive tests ordered to evaluate for the probability of rejection after pretest and assist in immunosuppression management. Studies have shown that these tests can help to rule out rejection without subjecting the patient to the bleeding, arrhythmia, and structural damage risks of invasive endomyocardial biopsies. The AlloMap and AlloSure tests help guide clinical decision making for this patient's surveillance schedule and immunosuppression adjustments. - continue allosure q2 week for now  #immunosuppression - C/w Prograf for goal 10-12 - C/w Cellcept 1gm BID - C/w prednisone taper per protocol, currently on 10mg BID  # Prophylaxis abx - CMV +/+: continue Valcyte 450mg qod, may need to adjust for renal function now that it's improving. Check CMV PCR with next set of labs - Toxo -/-: continue Mepron daily - candida ppx: continue nystatin S&S QID - Donor GP Cocci in clusters, blood (2/2) - Staph lugdenensis mecA/C+: IV Vanco switched to Cefazolin. Completed course of abx on 1/31  # Afib, now NSR - c/w MCOT - f/u with EP - has appt w/ Dr Umana 3/3  # Systemic sclerosis, Diffuse systemic sclerosis (LELAND positive all other serologies negative), skin bx consistent with Scl, - myocardial biopsy c/w scleroderma - continue current IS  - Monitor for scleroderma renal crisis while using high dose steroids. Monitor Scr, U/A and urine protein/Cr in setting of steroid use  - rheumatology follow up outpt - has appt 2/21  #EDER post op - continue to trend BUN/SCr, now downtrending Scr 1.5 down from 2.0,  down from 110 - adjust diuretics as above - renal US unremarkable - f/u with nephrocards, appreciate recs  #hyperglycemia related to steroids - continue with lantus 8u qHS and tradjenta 5mg daily - f/u with endocrine  # Health maintenance - MVI, PPI - encouraged walking program  Time spent with pt 35 min Findings, assessment and plan reviewed with Dr Schreiber RTC/TTE/Bx two weeks

## 2025-03-07 NOTE — PHYSICAL EXAM
[No Edema] : no edema [Normal] : alert and oriented, normal memory [de-identified] : jvd < 6cm [de-identified] : +midsternal chest wound incision with steristrips intact, dry; +MCOT in place left side chest

## 2025-03-09 NOTE — PLAN
[FreeTextEntry1] : 3/3/25 Plan: Wound is healed D/c triad - instead, Moisturize area daily Off-loading Follow up as needed

## 2025-03-09 NOTE — ASSESSMENT
[Verbal] : Verbal [Written] : Written [Family member] : Family member [Good - alert, interested, motivated] : Good - alert, interested, motivated [Verbalizes knowledge/Understanding] : Verbalizes knowledge/understanding [Skin Care] : skin care [Pressure relief] : pressure relief [Signs and symptoms of infection] : sign and symptoms of infection [Nutrition] : nutrition [How and When to Call] : how and when to call [Off-loading] : off-loading [Patient responsibility to plan of care] : patient responsibility to plan of care [FreeTextEntry1] : 3/3/25 On exam: wound is closed. Skin is dry (pt states she is applying triad daily - told to discontinue and moisturize only).

## 2025-03-09 NOTE — HISTORY OF PRESENT ILLNESS
[FreeTextEntry1] : Ms. NASEEM MITCHELL   presents to the office with a wound for over 4 weeks in duration.  The wound is located on  the left buttock.  The patient has complaints of dryness to the area.  Pt states the wound started some time after her heart transplant on 1/16/25. Pt states she was hospitalized for an extended period of time and one of the nurses a few weeks into her stay mentioned that she had a buttock wound. Pt states they discharged her and told her to use triad on the area.  The patient denies fevers or chills.  The patient has no other complaints or associated symptoms.

## 2025-03-09 NOTE — PHYSICAL EXAM
[JVD] : no jugular venous distention  [Normal Breath Sounds] : Normal breath sounds [Skin Ulcer] : no ulcer [Alert] : alert [Calm] : calm [de-identified] : NAD [de-identified] : AT [de-identified] : supple [Please See PDF for Tissue Analytics] : Please See PDF for [employer].

## 2025-03-09 NOTE — PHYSICAL EXAM
[JVD] : no jugular venous distention  [Normal Breath Sounds] : Normal breath sounds [Skin Ulcer] : no ulcer [Alert] : alert [Calm] : calm [de-identified] : NAD [de-identified] : AT [de-identified] : supple [Please See PDF for Tissue Analytics] : Please See PDF for [employer].

## 2025-03-13 NOTE — CARDIOLOGY SUMMARY
[de-identified] : 3/4/25: pending 2/18/25: HR 90bpm [de-identified] : 3/4/25: pending 2/18/25: EF normal 62%, normal RVSF, mod TR, no effusion, no significant changes 1/24/25: EF 75%, no significant changes 1/21/25: LVIDs 2.5 cm, EF normal, RV normal size/fn, mild TR, no effusion, IVC 1.9 cm  [de-identified] : 3/4/25 RHC/EMBx #4: pending 2/18/25 RHC/EMBx #3: RA 10, PA 36/12/21, PCWP 17, CO/CI 5/3.12, Grade 0R, C4d negative 2/5/25 RHC/EMBx #2: RA 12, PA 37/13/21, PCWP 11, CO/CI 5.45/3.18, Grade 0R, C4d negative  1/28/25 RHC/EMBx #1: RA 20, PA 36/18/24, PCWP 16, AO sat 100%, PA sat 52.3, Grade 1R/2, C4d negative

## 2025-03-13 NOTE — CARDIOLOGY SUMMARY
[de-identified] : 3/4/25: pending 2/18/25: HR 90bpm [de-identified] : 3/4/25: pending 2/18/25: EF normal 62%, normal RVSF, mod TR, no effusion, no significant changes 1/24/25: EF 75%, no significant changes 1/21/25: LVIDs 2.5 cm, EF normal, RV normal size/fn, mild TR, no effusion, IVC 1.9 cm  [de-identified] : 3/4/25 RHC/EMBx #4: pending 2/18/25 RHC/EMBx #3: RA 10, PA 36/12/21, PCWP 17, CO/CI 5/3.12, Grade 0R, C4d negative 2/5/25 RHC/EMBx #2: RA 12, PA 37/13/21, PCWP 11, CO/CI 5.45/3.18, Grade 0R, C4d negative  1/28/25 RHC/EMBx #1: RA 20, PA 36/18/24, PCWP 16, AO sat 100%, PA sat 52.3, Grade 1R/2, C4d negative

## 2025-03-13 NOTE — PHYSICAL EXAM
[No Edema] : no edema [Normal] : alert and oriented, normal memory [de-identified] : jvd < 6cm [de-identified] : +midsternal chest wound incision c/d/i, inferior aspect small opening debrided by Dr Song;

## 2025-03-13 NOTE — PHYSICAL EXAM
[No Edema] : no edema [Normal] : alert and oriented, normal memory [de-identified] : jvd < 6cm [de-identified] : +midsternal chest wound incision c/d/i, inferior aspect small opening debrided by Dr Song;

## 2025-03-13 NOTE — HISTORY OF PRESENT ILLNESS
[FreeTextEntry1] : 42F w/ seronegative systemic sclerosis/scleroderma (Dx 2022 via skin biopsy), HFrEF/NICM (EF 15%, LVEDD 5.1 cm; Dx 5/24; 2/2 systemic sclerosis confirmed by cardiac biopsy) s/p CRT-D (6/2024) as secondary prevention, VT s/p ablation (6/24/24), pericardial effusion and Raynaud's who is now s/p heart transplant on 1/14/25 (total ischemic time 233 min, CMV +/+ toxo -/-. donor GP Cocci in clusters, blood (2/2) - Staph lugdenensis mecA/C+).  Pre transplant course: who has an open evaluation for VAD/transplant (ABO B) who presents for RHC after she had VT degenerating into Vfib resulting in a shock while she was asleep. She has been functional but reports ~10 lb weigh gain over the past few weeks with poor appetite. Her RHC is notable for elevated BiV filling pressures with a low CO/CI and elevated PVR. She is undergoing AT eval given her advanced CM. Patient was listed for OHT on 1/14/25.  A suitable donor was identified and she's now s/p OHT on 1/16/25 (total ischemic time 233 min, CMV +/+ toxo -/-. donor GP Cocci in clusters, blood (2/2) - Staph lugdenensis mecA/C+). Postoperative complications included hemorrhage requiring multiple transfusions overnight s/p return to OR on 1/17 for clot evacuation and chest closure. She was extubated POD #2 and IABP removed as well.  was weaned off however she was noted to be persistently hypotensive requiring Midodrine and Fludrocortisone, now off and SBP have been ranging 100-110. Postop was noted to have worsening EDER for which cardiorenal is following. She underwent RHC/EMBx on 1/28 which revealed elevated filling pressures and her diuretics have been adjusted. At this time her volume status has improved though she still has some pitting edema near her ankles however she continues to have elevated renal function with BUN currently 110. Per Nephrology patient has been cleared to be discharged and if renal function continues to arise as outpatient may need to consider renal bx. Her course was further complicated by pleural effusion, s/p pigtail placement on 1/23-1/30. She was also noted to have run of Afib and converted to SR. EP was consulted and she will be discharged with Ziopatch in place. Pt discharged home on 2/10/25.  Pt here today for RHC/EMBx #4. Pt continues to feel well and progress activity at home. Exercising more- walked one 1 hour on walking pad Sunday total. She denies chest pain, palpitations, dizziness/lightheadedness or palpitations. Home -120/70-80's. -120 mg/dL taking insulin as directed. Wt stable 138lb. No LE edema. Moving bowels normally without diarrhea. Denies N/V/D. Appetite is fair and she is trying to increase her protein intake. Sleeping well. No issues with taking medications and taking everything as directed reporting 100% medication compliance.

## 2025-03-13 NOTE — DISCUSSION/SUMMARY
[FreeTextEntry1] : #s/p OHT 1/16/25 - Post op hemorrhage, multiple transfusions s/p return to OR on 1/17 for clot evacuation and chest closure. CDC Crossmatch negative, however, DPB1 at 3K at time of transplant.   - Echo w/ normal graft function - CAV ppx: Started ASA 81mg daily 2/28, continue Pravachol 40mg daily - RHC with elevated filling pressures on diuresis - Diuretics: current dose Bumex 2mg BID QOD - DSAs 1/16 DP1 3913 mfi (time of transplant), 1/28 DP1 3644 mfi, 1/30 DP1 6090 mfi; continue to trend (send with each Bx), 2/6 3K, send today - This patient has a heart allograft and is at risk for rejection through immune system recognition of non-self tissue. AlloMap and AlloSure and noninvasive tests ordered to evaluate for the probability of rejection after pretest and assist in immunosuppression management. Studies have shown that these tests can help to rule out rejection without subjecting the patient to the bleeding, arrhythmia, and structural damage risks of invasive endomyocardial biopsies. The AlloMap and AlloSure tests help guide clinical decision making for this patient's surveillance schedule and immunosuppression adjustments. - continue allosure q2 week for now  #immunosuppression - C/w Prograf for goal 10-12 - C/w Cellcept 1gm BID - C/w prednisone taper per protocol, currently on 10mg BID  # Prophylaxis abx - CMV +/+: continue Valcyte 450mg qod, may need to adjust for renal function now that it's improving. Check CMV PCR with next set of labs - Toxo -/-: continue Mepron daily - candida ppx: continue nystatin S&S QID - Donor GP Cocci in clusters, blood (2/2) - Staph lugdenensis mecA/C+: IV Vanco switched to Cefazolin. Completed course of abx on 1/31  # Afib, now NSR - c/w MCOT - f/u with EP - had appt w/ Dr Umana yesterday - Zio patch results pending  # Systemic sclerosis, Diffuse systemic sclerosis (LELAND positive all other serologies negative), skin bx consistent with Scl, - myocardial biopsy c/w scleroderma - continue current IS  - Monitor for scleroderma renal crisis while using high dose steroids. Monitor Scr, U/A and urine protein/Cr in setting of steroid use  - rheumatology follow up outpt - has appt 2/21  #EDER post op - continue to trend BUN/SCr, now downtrending Scr 1.5 down from 2.0,  down from 110 - adjust diuretics as above - renal US unremarkable - f/u with nephrocards, appreciate recs  #hyperglycemia related to steroids - continue with lantus 8u qHS and tradjenta 5mg daily - f/u with endocrine  # Health maintenance - MVI, PPI - encouraged walking program  Time spent with pt 35 min Findings, assessment and plan reviewed with Dr Schreiber RTC/TTE/Bx two weeks

## 2025-03-17 NOTE — ASSESSMENT
[FreeTextEntry1] : Systemic sclerosis (LELAND positive all other serologies negative), skin bx consistent with Scl.  1. Systemic sclerosis based on skin biopsy: has skin tightening up to the MCPs bilaterally, salt and pepper pigmentation of the skin, small oral aperture, cardiomyopathy s/p cardiac transplant, pericardial, and pleural effusion, Raynaud's with acro-osteolysis, arthralgia.  i) Skin: skin tightening up to the MCPs bilaterally, salt and pepper pigmentation of the skin, no telangiectasias. Raynaud's symptoms with acro-osteolysis. Advised conservative management with hand warmers and avoiding triggers.   ii) MSK: has arthralgia in the knees and shoulders. Manageable for now but can consider adding Plaquenil should the symptoms become persistent. Acute R knee pain at night for the last 3 days, no knee swelling, no pain with activity or at rest otherwise. advised to try conservative measures for now. If persists or worsens, will get x-rays.  iii) Pulmonary: No ILD per CT chest. Has small pleural effusions.  iv) Cardiac: s/p cardiac transplant. Being followed closely as there was an initial concern for rejection, now negative. On steroid taper. CellCept + Tacrolimus. v) GI: no active issues. Patient denies GERD. No h/o peptic ulcers, GAVE, gastroparesis, GI bleed. Occasional constipation. Has liver fibrosis. vi) Renal: EDER after transplant, now improving. Being monitored closely, may consider biopsy if worsening. Advised to monitor BP at home. Repeat labs tomorrow.  vii) Heme: anemia, stable  viii) Neuro: no active issues  Follow up in 6 weeks

## 2025-03-17 NOTE — HISTORY OF PRESENT ILLNESS
[___ Week(s) Ago] : [unfilled] week(s) ago [FreeTextEntry1] : Serum Creatinine improving steadily. Last path without evidence of rejection. Prednisone slowly being tapered - currently on 7.5 mg BID. Tacrolimus at goal. Bumex dose reduced to every other day.

## 2025-03-17 NOTE — DATA REVIEWED
[FreeTextEntry1] : CT Chest, abdomen and pelvis (01/2025): Evaluation of the solid organs and vasculature is limited without intravenous contrast. CHEST: LUNGS AND LARGE AIRWAYS: Patent central airways. No pulmonary nodules. PLEURA: Small right and trace left pleural effusions with adjacent compressive atelectasis. VESSELS: Within normal limits. HEART: Heart transplant. No pericardial effusion. MEDIASTINUM AND LANI: No lymphadenopathy. Small amount of air and mediastinal drain. CHEST WALL AND LOWER NECK: Postsurgical changes status post median sternotomy. ABDOMEN AND PELVIS: LIVER: Within normal limits. BILE DUCTS: Normal caliber. GALLBLADDER: Sludge. SPLEEN: Within normal limits. PANCREAS: Within normal limits. ADRENALS: Within normal limits. KIDNEYS/URETERS: Within normal limits. BLADDER: Within normal limits. REPRODUCTIVE ORGANS: Uterus and adnexa within normal limits. BOWEL: No bowel obstruction. Appendix is normal. PERITONEUM/RETROPERITONEUM: Small ascites. VESSELS: Within normal limits. LYMPH NODES: No lymphadenopathy. ABDOMINAL WALL: Subcutaneous edema. BONES: Within normal limits. IMPRESSION: Small right and trace left pleural effusions. Small ascites.   CT Chest (12/2024): Mildly motion limited assessment. LUNGS AND LARGE AIRWAYS: Patent central airways. Clear lungs PLEURA: Right small pleural effusion. VESSELS: Within normal limits. HEART: Cardiomegaly. . Moderate pericardial effusion. New multiple intracardiac leads and AICD coil. MEDIASTINUM AND LANI: No lymphadenopathy. CHEST WALL AND LOWER NECK: New left trilead cardiac device. VISUALIZED UPPER ABDOMEN: Partially imaged gallbladder with mild edema, likely related to cardiac infection. BONES: Mild degenerative changes of the spine. IMPRESSION: Clear lungs Moderate pericardial effusion  Heart pathology (02/2025): 1  Right heart ventricle x4 2  Right heart ventricle x1 Final Diagnosis 1. Heart, transplant, right ventricle, biopsy       - No cellular rejection, ISHLT grade 0R - C4d immunostain is negative. 2. Heart, transplant, right ventricle, biopsy - Immunofluorescence for C4d is negative  CPET 11/8/2024: Protocol 10 WATT ramp, reason stopped: leg fatigue. Exercise time 8:45min. Conclusion: Abnormal CPET consistent with patient cardiomyopathy. The patient has Brito Class D cardiac failure based on the maximal oxygen uptake (peak VO2) of 8.9 ml/kg/min (pVO2<50% predicated is of greater concern in a young patient) Most markers of ventilatory efficiency were abnormal. While her cardiomyopathy is likely the proinent reason for her reduced exercise tolerance, there is also a component of deconditioning given the low markers of aerobic capacity (VO2 at AT, VO2/work slope, OUES) along with an elevated dyspnea index and exaggerated heart rate response to exercise. Of Note, the patient exhibited an oscillatory breathing pattern during early exercise which may be a marker of disease severity in heart failure patients. There are significant markers of impaired aerobic capacity and ventilatory efficiency which are particularly concerning in a young patient for poor outcomes. Would warrant an evaluation for advanced therapies. She also demonstrated an oscillatory breathing patter which may indicate an underlying sleep disorder and would recommend a sleep study. There is also a component of deconditioning and would benefit from cardiac rehab. A home exercise prescription plan will be provided to her.  Echo: TTE 9/18/24: LVEF 25% (global), mild RVE with moderately reduced function (TAPSE 1.4), normal LA, TRICIA, mild MR, mod TR, est PASP 25 mmHg, IVC normal size with normal inspiratory collapse, large circumferential pericardial effusion measuring 2.1 cm posterior/lateral to LV without clear signs of increased intrapericardial pressures  6/28/24 - EF 15%, LVEDD 5.1 cm) mod localized pericardial effusion posterior and lateral to LV; small pericardial effusion adjacent to RV; mod adjacent to RA; >60% respriatory variation across TV; no diastolic inversion of RV or diastolic collapse of RA; IVC not plethoric (1.9 cm)  6/9/24- LVEF 20%, LVIDd 5.8cm, LVPWd 0.9 cm, IVSd 0.9cm, LA 4.15cm, RV 11.20cm, RV reduced systolic function, moderate MR, PASP 44mmHg, IVC 2.22cm, moderate pericardial effusion adjacent to posterior and lateral left ventricle and right atrium, a trace pericardial effusion noted adjacent to the apex with no evidence of RV diastolic collapse.  5/9/24 - moderate pericardial effusion adjacent to the posterior left ventricle, moderate pericardial effusion adjacent to the lateral left ventricle and a small pericardial effusion adjacent to the right atrium with no evidence of hemodynamic compromise (or echocardiographic evidence of cardiac tamponade  5/3/24 - LVEF 19%, LVIDD 5.1cm, enlarged RV and decreased RV systolic function, mild MR, moderate pericardial effusion    CT/MRI: 5/10/24 - cardiac MRI - EF 13%; Enlarged right ventricle with associated hypokinesis. Normal left ventricular size with global left ventricular hypokinesis with sparing of the septum. Limited late gadolinium enhancement images. Apparent late gadolinium enhancement at the basal lateral myocardial that may represent artifact.    EP: 6/24/24 - VT ablation of LV septum with Repeat EP Study 6/26 unable to induce   Cardiac Cath/PCI: 6/20/24 - RHC/EMBx - BP missing; RA 9 (v 12), RV 52/10, PA 52/16/33, PCWP 15 (v 19), CO/CI 7.4/4.2; path - patchy interstital fibrosis and myocyte hypertrophic changes; no inflammatory infiltrate seen  5/9/24 - /68, RA 7, RV 43/7/13, PAP 42/16/29, PCWP 17 (v 24), TPG 12, CO/CI: 4.89/2.79 non-obstructive coronaries

## 2025-03-18 NOTE — HISTORY OF PRESENT ILLNESS
[FreeTextEntry1] : Mr. Hernandez is a 41 y/o F w/ seronegative systemic sclerosis/scleroderma (Dx 2022 via skin biopsy), prior stage D HFrEF/NICM (EF 15%, LVEDD 5.1 cm; Dx 5/24; 2/2 systemic sclerosis confirmed by cardiac biopsy) s/p CRT-D (6/2024) as secondary prevention with prior VT s/p ablation (6/24/24) and pericardial effusion s/p heart transplant on 1/14/25 (total ischemic time 233 min, CMV +/+, Toxo -/-), post-transplant EDER (Cr peak at 2.2; now 1.4) who presents for f/u..  For details of initial HF course, please refer to note from 5/17/24 and 8/14/24.   Pre transplant course:  1/6/25-2/6/25 - presented for RHC after she had VT degenerating into Vfib resulting in a shock while she was asleep. Had elevated BiV filling pressures with a low CO/CI and elevated PVR. Had IABP placed and required inotropes. Was listed for OHT on 1/14/25. Underwent OHT 1/16/25 (total ischemic time 233 min, CMV +/+ toxo -/-. donor GP Cocci in clusters, blood (2/2) - Staph rhondais mecA/C+) with postoperative complications included hemorrhage requiring multiple transfusions overnight s/p return to OR on 1/17 for clot evacuation and chest closure. Noted to be hypotensive (preserved CI, low SVR) requiring Midodrine and Fludrocortisone which were weaned off. Had EDER (peak Cr 2.2) liang to 2.0.  Her course was further complicated by pleural effusion, s/p pigtail placement on 1/23-1/30. She was also noted to have run of Afib and converted to SR. EP was consulted and had Ziopatch.   Patient presents today for routine RHC/EMBx #5. Patient continues to feel well overall. Performs all daily activities without symptoms and walks 30min-1hr daily on walking pad. Home VS log reviewed: -120/80-90s, afebrile. Last clinic visit on 3/6 Bumex was reduced to 2mg QOD and wt was 138lb. Per pt, she followed that regimen for 1 week but last week she began feeling bloated (also had menstrual period) and took Bumex Wed-Fri, skipped Sat, took Sun/Mon. Wt during this period fluctuated 137-139lb. Bloating has now resolved and pt denied SOB, MANZO, orthopnea or LE edema. She also denies chest pain, palpitations, dizziness/lightheadedness. -180 mg/dL taking insulin as directed.  Moving bowels normally without diarrhea. Denies N/V/D. Appetite is good. Sternal incision "healing well" and pt puts gauze dressing on daily; no sternal discomfort or drainage. Sleeping well. No issues with taking medications and taking everything as directed.  
[FreeTextEntry1] : Mr. Hernandez is a 41 y/o F w/ seronegative systemic sclerosis/scleroderma (Dx 2022 via skin biopsy), prior stage D HFrEF/NICM (EF 15%, LVEDD 5.1 cm; Dx 5/24; 2/2 systemic sclerosis confirmed by cardiac biopsy) s/p CRT-D (6/2024) as secondary prevention with prior VT s/p ablation (6/24/24) and pericardial effusion s/p heart transplant on 1/14/25 (total ischemic time 233 min, CMV +/+, Toxo -/-), post-transplant EDER (Cr peak at 2.2; now 1.4) who presents for f/u..  For details of initial HF course, please refer to note from 5/17/24 and 8/14/24.   Pre transplant course:  1/6/25-2/6/25 - presented for RHC after she had VT degenerating into Vfib resulting in a shock while she was asleep. Had elevated BiV filling pressures with a low CO/CI and elevated PVR. Had IABP placed and required inotropes. Was listed for OHT on 1/14/25. Underwent OHT 1/16/25 (total ischemic time 233 min, CMV +/+ toxo -/-. donor GP Cocci in clusters, blood (2/2) - Staph rhondais mecA/C+) with postoperative complications included hemorrhage requiring multiple transfusions overnight s/p return to OR on 1/17 for clot evacuation and chest closure. Noted to be hypotensive (preserved CI, low SVR) requiring Midodrine and Fludrocortisone which were weaned off. Had EDER (peak Cr 2.2) liang to 2.0.  Her course was further complicated by pleural effusion, s/p pigtail placement on 1/23-1/30. She was also noted to have run of Afib and converted to SR. EP was consulted and had Ziopatch.   Patient presents today for routine RHC/EMBx #5. Patient continues to feel well overall. Performs all daily activities without symptoms and walks 30min-1hr daily on walking pad. Home VS log reviewed: -120/80-90s, afebrile. Last clinic visit on 3/6 Bumex was reduced to 2mg QOD and wt was 138lb. Per pt, she followed that regimen for 1 week but last week she began feeling bloated (also had menstrual period) and took Bumex Wed-Fri, skipped Sat, took Sun/Mon. Wt during this period fluctuated 137-139lb. Bloating has now resolved and pt denied SOB, MANZO, orthopnea or LE edema. She also denies chest pain, palpitations, dizziness/lightheadedness. -180 mg/dL taking insulin as directed.  Moving bowels normally without diarrhea. Denies N/V/D. Appetite is good. Sternal incision "healing well" and pt puts gauze dressing on daily; no sternal discomfort or drainage. Sleeping well. No issues with taking medications and taking everything as directed.

## 2025-03-18 NOTE — PHYSICAL EXAM
[No Edema] : no edema [Normal] : alert and oriented, normal memory [de-identified] : jvd < 6cm [de-identified] : +midsternal chest wound incision with inferior aspect c/d/i

## 2025-03-18 NOTE — CARDIOLOGY SUMMARY
[de-identified] : 3/18/25: HR 87bpm 3/3/25: sinus, low voltage, PRWP  2/18/25: HR 90bpm [de-identified] : 3/18/25:  3/3/25: LVEF 57% no RWMA, normal RVSF, mild-mod TR, no pericardial effusion 2/18/25: EF normal 62%, normal RVSF, mod TR, no effusion, no significant changes 1/24/25: EF 75%, no significant changes 1/21/25: LVIDs 2.5 cm, EF normal, RV normal size/fn, mild TR, no effusion, IVC 1.9 cm  [de-identified] : 3/18/25 RHC/EMBx #5: RA 11, PAP 38/9/20, PCWP 16, CO/CI 4.6/2.85, ISHLT Grade pending 3/4/25 RHC/EMBx #4: RA 6, PAP 32/9/17, PCWP 8, CO/CI 5.4/3.3, Grade 0R, C4d negative 2/18/25 RHC/EMBx #3: RA 10, PA 36/12/21, PCWP 17, CO/CI 5/3.12, Grade 0R, C4d negative 2/5/25 RHC/EMBx #2: RA 12, PA 37/13/21, PCWP 11, CO/CI 5.45/3.18, Grade 0R, C4d negative  1/28/25 RHC/EMBx #1: RA 20, PA 36/18/24, PCWP 16, AO sat 100%, PA sat 52.3, Grade 1R/2, C4d negative

## 2025-03-18 NOTE — CARDIOLOGY SUMMARY
[de-identified] : 3/18/25: HR 87bpm 3/3/25: sinus, low voltage, PRWP  2/18/25: HR 90bpm [de-identified] : 3/18/25:  3/3/25: LVEF 57% no RWMA, normal RVSF, mild-mod TR, no pericardial effusion 2/18/25: EF normal 62%, normal RVSF, mod TR, no effusion, no significant changes 1/24/25: EF 75%, no significant changes 1/21/25: LVIDs 2.5 cm, EF normal, RV normal size/fn, mild TR, no effusion, IVC 1.9 cm  [de-identified] : 3/18/25 RHC/EMBx #5: RA 11, PAP 38/9/20, PCWP 16, CO/CI 4.6/2.85, ISHLT Grade pending 3/4/25 RHC/EMBx #4: RA 6, PAP 32/9/17, PCWP 8, CO/CI 5.4/3.3, Grade 0R, C4d negative 2/18/25 RHC/EMBx #3: RA 10, PA 36/12/21, PCWP 17, CO/CI 5/3.12, Grade 0R, C4d negative 2/5/25 RHC/EMBx #2: RA 12, PA 37/13/21, PCWP 11, CO/CI 5.45/3.18, Grade 0R, C4d negative  1/28/25 RHC/EMBx #1: RA 20, PA 36/18/24, PCWP 16, AO sat 100%, PA sat 52.3, Grade 1R/2, C4d negative

## 2025-03-18 NOTE — PHYSICAL EXAM
[No Edema] : no edema [Normal] : alert and oriented, normal memory [de-identified] : jvd < 6cm [de-identified] : +midsternal chest wound incision with inferior aspect c/d/i

## 2025-03-18 NOTE — DISCUSSION/SUMMARY
[FreeTextEntry1] : #s/p OHT 1/16/25 - Post op hemorrhage, multiple transfusions s/p return to OR on 1/17 for clot evacuation and chest closure. CDC Crossmatch negative, however, DPB1 at 3K at time of transplant.   - Echo todaypending - CAV ppx: Started ASA 81mg daily 2/28, continue Pravachol 40mg daily - RHC with elevated filling pressures on diuresis - Diuretics: currently on Bumex 2mg QOD -- will increase to 2mg daily d/t mildly elevated filling pressures - DSAs 1/16 DP1 3913 mfi (time of transplant), 1/28 DP1 3644 mfi, 1/30 DP1 6090 mfi; 3/4 DP1 3299 mfi; continue to trend (send with each Bx) - This patient has a heart allograft and is at risk for rejection through immune system recognition of non-self tissue. AlloMap and AlloSure and noninvasive tests ordered to evaluate for the probability of rejection after pretest and assist in immunosuppression management. Studies have shown that these tests can help to rule out rejection without subjecting the patient to the bleeding, arrhythmia, and structural damage risks of invasive endomyocardial biopsies. The AlloMap and AlloSure tests help guide clinical decision making for this patient's surveillance schedule and immunosuppression adjustments. - continue allosure q2 week for now  # Immunosuppression - C/w Prograf for goal 10-12 - C/w Cellcept 1G BID - C/w prednisone taper per protocol, currently on 7.5mg BID   # Prophylaxis abx - CMV +/+: continue Valcyte 450mg QOD- currently on hold 2/2 leukopenia. CMV PCR sent today - Toxo -/-: continue Mepron daily - candida ppx: continue nystatin S&S QID - Donor GP Cocci in clusters, blood (2/2) - Staph lugdenensis mecA/C+: IV Vanco switched to Cefazolin. Completed course of abx on 1/31  # Afib, now NSR - Follows w/ EP Dr Umana  - S/p MCOT 2-week monitoring: No Afib noted. No pt triggered events.   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 Monitor Scr, U/A and urine protein/Cr in setting of steroid use  - labs ordered - C/w rheum follow-up   #EDER post op - continue to trend BUN/SCr, now downtrending Scr 1.5 down from 2.0,  down from 110 - adjust diuretics as above - renal US unremarkable - f/u with nephrocards, appreciate recs  #hyperglycemia related to steroids - continue with lantus 8u qHS and tradjenta 5mg daily - f/u with endocrine  # Health maintenance - MVI, PPI - encouraged walking program  Time spent with pt 35 min Findings, assessment and plan reviewed with Dr Schreiber RTC/TTE/Bx two weeks

## 2025-03-21 NOTE — REASON FOR VISIT
[FreeTextEntry3] : Dr. Carson Pereira [FreeTextEntry1] : Rheumatology: Dr. Katherine Kenney HF: Dr. Davie Schreiber

## 2025-03-21 NOTE — CARDIOLOGY SUMMARY
[de-identified] : ECG from 3/21/2025: Sinus rhythm at 100bpm, LA: 112ms, single APC ECG from 9/6/2024: Sinus rhythm with biventricular pacing (paced QRSd: 136ms), diffuse low voltage, single PVC ECG from 6/20/2024: VT - L bundle, left superior axis, V2-V3 pattern break, 173bpm (TCL: 346ms)  [de-identified] : Zio from 2/6/2025-2/20/2025: min HR: 50, max HR: 200, mean HR: 89, no patient triggered events, 7 episodes of NSVT (longest 4 beats), no AF [de-identified] : TTE from 3/18/2025: RV grossly mildly dilated, mild-mod TR, no pericardial effusion  TTE from 6/13/2024: severely decreased LVSF, EF: 23%, normal RV size with reduced RVSF, moderate pericardial effusion without evidence of hemodynamic compromise [de-identified] : Cardiac MRI from 5/10/2024: enlarged RV with associated hypokinesis, normal LV size with global LV hypokinesis with sparing of the septum, limited LGE images (apparent LGE at the basal lateral myocardium - may represent artifact), LVEF: 13%, RVEF: 8% [de-identified] : Medtronic CRT-D implanted 6/27/2024: did CRT-D given HV: 78ms [de-identified] : EPS 6/26/2024: Due to recurrence of clinical tachycardia L bundle, left superior axis, no inducible arrhythmias  VT ablation 6/24/2024: Multiple VTs induced - treatment on LV septum, R and L bundle VT morphologies [de-identified] : L + RHC from 5/9/2024: mild CAD with no flow limiting lesions, LVEDP: 17, RA: 7, RV: 43/7 (13), PA: 46/16 (29), PCWP: 17, CO/CI: 4.89/2.79 [de-identified] : EMBx from 6/20/2024: Patchy interstitial fibrosis and myocyte hypertrophic changes. No inflammatory infiltrate is present. No hemosiderin deposition in the fibrotic areas is identified. Given the clinical history of scleroderma, these histologic changes are consistent with that diagnosis.

## 2025-03-21 NOTE — HISTORY OF PRESENT ILLNESS
[FreeTextEntry1] : Nancy Hernandez is a 42-year-old woman with seronegative systemic sclerosis/scleroderma (diagnosed in 2022 via a skin biopsy), heart failure with a reduced ejection fraction (13% from a cardiac MRI performed in May of 2024, due to a nonischemic cardiomyopathy, status post a Medtronic biventricular implantable cardioverter defibrillator on June 27th, 2024), ventricular tachycardia (status post a catheter ablation on June 24th, 2024). She underwent a heart transplant on January 14th, 2025. In the post-operative period she had paroxysmal atrial fibrillation. She presents today for a follow-up visit.   To briefly summarize her history, she was admitted to the [facility] CCU on June 9th, 2024 due to acute on chronic heart failure exacerbation and ventricular tachycardia storm requiring an intra-aortic balloon pump. She was transferred to [facility] on June 11th, 2024 for an advanced therapies evaluation. She underwent a catheter ablation of ventricular tachycardia on June 27th, 2024. Multiple ventricular tachycardias were targeted on the left ventricular septum. Her HV interval was noted to be 78ms. She had a known large pericardial effusion at the start of the procedure. There was no change in the size of the pericardial effusion at the end of the procedure. Following the procedure she had a recurrence of her clinical tachycardia (a left bundle, left superior axis ventricular tachycardia). She underwent a repeat invasive electrophysiology study on June 26th, 2024. Despite single, double and triple ventricular extrastimuli at two drive train cycle lengths from two sites along with programmed ventricular stimulation on Dobutamine her clinical tachycardia was not induced. No additional ablation was performed. She received a Medtronic biventricular implantable cardioverter defibrillator on June 27th, 2024 given her infranodal conduction disease. Her Amiodarone was discontinued in September of 2024. In January of 2025 she presented to [facility] with recurrent ventricular tachycardia in the setting of cardiogenic shock necessitating an intra-aortic balloon pump. She underwent a heart transplant on January 14th, 2025. Her biventricular defibrillator was explanted at the time of the transplant. She had evidence of paroxysmal atrial fibrillation in the post-operative period following her heart transplant. A monitor worn from February 6th, 2025 through February 20th, 2025 did not reveal any evidence of atrial fibrillation.  She reports that she has been feeling well. She is using a walking pad and is able to walk for 40 minutes. She has a [employer] watch. She has not received any notifications from her watch that she is having atrial fibrillation. She recalls feeling palpitations when she was hospitalized and was having atrial fibrillation. She has not expericned any similar symptoms since she left the hospital. No reports of chest pain, shortness of breath, dizziness, palpitations, lightheadedness, pre-syncope or syncope.

## 2025-03-21 NOTE — PHYSICAL EXAM
[Well Developed] : well developed [Well Nourished] : well nourished [No Acute Distress] : no acute distress [Normal Venous Pressure] : normal venous pressure [Normal S1, S2] : normal S1, S2 [No Murmur] : no murmur [No Rub] : no rub [No Gallop] : no gallop [Clear Lung Fields] : clear lung fields [Good Air Entry] : good air entry [No Respiratory Distress] : no respiratory distress  [Soft] : abdomen soft [Non Tender] : non-tender [Normal Gait] : normal gait [No Edema] : no edema [No Rash] : no rash [Moves all extremities] : moves all extremities [Normal Speech] : normal speech [Alert and Oriented] : alert and oriented [Normal memory] : normal memory [de-identified] : Dressing over superior aspect of sternotomy

## 2025-03-21 NOTE — DISCUSSION/SUMMARY
[FreeTextEntry1] : Nancy Hernandez is a 42-year-old woman with seronegative systemic sclerosis/scleroderma (diagnosed in 2022 via a skin biopsy), heart failure with a reduced ejection fraction (13% from a cardiac MRI performed in May of 2024, due to a nonischemic cardiomyopathy, status post a Medtronic biventricular implantable cardioverter defibrillator on June 27th, 2024), ventricular tachycardia (status post a catheter ablation on June 24th, 2024). She underwent a heart transplant on January 14th, 2025. In the post-operative period she had paroxysmal atrial fibrillation. She presents today for a follow-up visit. She is doing well and has not had any evidence of recurrent atrial fibrillation (based on her symptoms and a Zio monitor in February) since leaving the hospital following her heart transplant.  Given that her atrial fibrillation occurred in the post-operative setting, and was brief in nature, I did not recommend initiation of oral anticoagulation. She was aware when she was in atrial fibrillation during her hospitalization and also has a smart watch. I recommended that she continue to follow with the heart failure team. She will see me again as needed.  [EKG obtained to assist in diagnosis and management of assessed problem(s)] : EKG obtained to assist in diagnosis and management of assessed problem(s)

## 2025-03-28 NOTE — PHYSICAL EXAM
[No Edema] : no edema [Normal] : alert and oriented, normal memory [de-identified] : jvd < 6cm [de-identified] : +midsternal chest wound incision with inferior aspect c/d/i

## 2025-03-28 NOTE — HISTORY OF PRESENT ILLNESS
[FreeTextEntry1] : Mr. Hernandez is a 41 y/o F w/ seronegative systemic sclerosis/scleroderma (Dx 2022 via skin biopsy), prior stage D HFrEF/NICM (EF 15%, LVEDD 5.1 cm; Dx 5/24; 2/2 systemic sclerosis confirmed by cardiac biopsy) s/p CRT-D (6/2024) as secondary prevention with prior VT s/p ablation (6/24/24) and pericardial effusion s/p heart transplant on 1/14/25 (total ischemic time 233 min, CMV +/+, Toxo -/-), post-transplant EDER (Cr peak at 2.2; now 1.4) who presents for f/u..  For details of initial HF course, please refer to note from 5/17/24 and 8/14/24.   Pre transplant course:  1/6/25-2/6/25 - presented for RHC after she had VT degenerating into Vfib resulting in a shock while she was asleep. Had elevated BiV filling pressures with a low CO/CI and elevated PVR. Had IABP placed and required inotropes. Was listed for OHT on 1/14/25. Underwent OHT 1/16/25 (total ischemic time 233 min, CMV +/+ toxo -/-. donor GP Cocci in clusters, blood (2/2) - Staph rhondais mecA/C+) with postoperative complications included hemorrhage requiring multiple transfusions overnight s/p return to OR on 1/17 for clot evacuation and chest closure. Noted to be hypotensive (preserved CI, low SVR) requiring Midodrine and Fludrocortisone which were weaned off. Had EDER (peak Cr 2.2) liang to 2.0.  Her course was further complicated by pleural effusion, s/p pigtail placement on 1/23-1/30. She was also noted to have run of Afib and converted to SR. EP was consulted and had Ziopatch.   Patient presents today for routine RHC/EMBx #6. Patient continues to feel well overall. Performs all daily activities without symptoms and walks 30min-1hr daily on walking pad. Home VS log reviewed: -120/80s, afebrile. For the past week weight has remained stable at 139lb on Bumex 2mg daily; she denies edema or bloating, SOB, MANZO, orthopnea or LE edema. She also denies chest pain, palpitations, dizziness/lightheadedness. -140 mg/dL taking insulin as directed.  Moving bowels normally without diarrhea. Denies N/V/D. Appetite is good. Sternal incision "healing well" and pt puts gauze dressing on daily; no sternal discomfort or drainage. Sleeping well. No issues with taking medications and reports 100% medication compliance.

## 2025-03-28 NOTE — HISTORY OF PRESENT ILLNESS
[FreeTextEntry1] : Mr. Hernandez is a 43 y/o F w/ seronegative systemic sclerosis/scleroderma (Dx 2022 via skin biopsy), prior stage D HFrEF/NICM (EF 15%, LVEDD 5.1 cm; Dx 5/24; 2/2 systemic sclerosis confirmed by cardiac biopsy) s/p CRT-D (6/2024) as secondary prevention with prior VT s/p ablation (6/24/24) and pericardial effusion s/p heart transplant on 1/14/25 (total ischemic time 233 min, CMV +/+, Toxo -/-), post-transplant EDER (Cr peak at 2.2; now 1.4) who presents for f/u..  For details of initial HF course, please refer to note from 5/17/24 and 8/14/24.   Pre transplant course:  1/6/25-2/6/25 - presented for RHC after she had VT degenerating into Vfib resulting in a shock while she was asleep. Had elevated BiV filling pressures with a low CO/CI and elevated PVR. Had IABP placed and required inotropes. Was listed for OHT on 1/14/25. Underwent OHT 1/16/25 (total ischemic time 233 min, CMV +/+ toxo -/-. donor GP Cocci in clusters, blood (2/2) - Staph rhondais mecA/C+) with postoperative complications included hemorrhage requiring multiple transfusions overnight s/p return to OR on 1/17 for clot evacuation and chest closure. Noted to be hypotensive (preserved CI, low SVR) requiring Midodrine and Fludrocortisone which were weaned off. Had EDER (peak Cr 2.2) liang to 2.0.  Her course was further complicated by pleural effusion, s/p pigtail placement on 1/23-1/30. She was also noted to have run of Afib and converted to SR. EP was consulted and had Ziopatch.   Patient presents today for routine RHC/EMBx #6. Patient continues to feel well overall. Performs all daily activities without symptoms and walks 30min-1hr daily on walking pad. Home VS log reviewed: -120/80s, afebrile. For the past week weight has remained stable at 139lb on Bumex 2mg daily; she denies edema or bloating, SOB, MANZO, orthopnea or LE edema. She also denies chest pain, palpitations, dizziness/lightheadedness. -140 mg/dL taking insulin as directed.  Moving bowels normally without diarrhea. Denies N/V/D. Appetite is good. Sternal incision "healing well" and pt puts gauze dressing on daily; no sternal discomfort or drainage. Sleeping well. No issues with taking medications and reports 100% medication compliance.

## 2025-03-28 NOTE — PHYSICAL EXAM
[No Edema] : no edema [Normal] : alert and oriented, normal memory [de-identified] : jvd < 6cm [de-identified] : +midsternal chest wound incision with inferior aspect c/d/i

## 2025-03-28 NOTE — CARDIOLOGY SUMMARY
[de-identified] : 3/18/25: HR 87bpm 3/3/25: sinus, low voltage, PRWP  2/18/25: HR 90bpm [de-identified] : 3/25/25: LVEF is normal, mildly reduced RVSF, no effusion 3/3/25: LVEF 57% no RWMA, normal RVSF, mild-mod TR, no pericardial effusion 2/18/25: EF normal 62%, normal RVSF, mod TR, no effusion, no significant changes 1/24/25: EF 75%, no significant changes 1/21/25: LVIDs 2.5 cm, EF normal, RV normal size/fn, mild TR, no effusion, IVC 1.9 cm  [de-identified] : 3/25/25 RHC/EMBx: RA 8, PAP 31/9/20, PCWP 15, CO/CI 5.7/3.1, ISHLT Grade pending 3/18/25 RHC/EMBx #5: RA 11, PAP 38/9/20, PCWP 16, CO/CI 4.6/2.85, ISHLT Grade 0R, H&E frozen section in 2nd sample for C4d IF showed grade 2R 3/4/25 RHC/EMBx #4: RA 6, PAP 32/9/17, PCWP 8, CO/CI 5.4/3.3, Grade 0R, C4d negative 2/18/25 RHC/EMBx #3: RA 10, PA 36/12/21, PCWP 17, CO/CI 5/3.12, Grade 0R, C4d negative 2/5/25 RHC/EMBx #2: RA 12, PA 37/13/21, PCWP 11, CO/CI 5.45/3.18, Grade 0R, C4d negative  1/28/25 RHC/EMBx #1: RA 20, PA 36/18/24, PCWP 16, AO sat 100%, PA sat 52.3, Grade 1R/2, C4d negative

## 2025-03-28 NOTE — CARDIOLOGY SUMMARY
[de-identified] : 3/18/25: HR 87bpm 3/3/25: sinus, low voltage, PRWP  2/18/25: HR 90bpm [de-identified] : 3/25/25: LVEF is normal, mildly reduced RVSF, no effusion 3/3/25: LVEF 57% no RWMA, normal RVSF, mild-mod TR, no pericardial effusion 2/18/25: EF normal 62%, normal RVSF, mod TR, no effusion, no significant changes 1/24/25: EF 75%, no significant changes 1/21/25: LVIDs 2.5 cm, EF normal, RV normal size/fn, mild TR, no effusion, IVC 1.9 cm  [de-identified] : 3/25/25 RHC/EMBx: RA 8, PAP 31/9/20, PCWP 15, CO/CI 5.7/3.1, ISHLT Grade pending 3/18/25 RHC/EMBx #5: RA 11, PAP 38/9/20, PCWP 16, CO/CI 4.6/2.85, ISHLT Grade 0R, H&E frozen section in 2nd sample for C4d IF showed grade 2R 3/4/25 RHC/EMBx #4: RA 6, PAP 32/9/17, PCWP 8, CO/CI 5.4/3.3, Grade 0R, C4d negative 2/18/25 RHC/EMBx #3: RA 10, PA 36/12/21, PCWP 17, CO/CI 5/3.12, Grade 0R, C4d negative 2/5/25 RHC/EMBx #2: RA 12, PA 37/13/21, PCWP 11, CO/CI 5.45/3.18, Grade 0R, C4d negative  1/28/25 RHC/EMBx #1: RA 20, PA 36/18/24, PCWP 16, AO sat 100%, PA sat 52.3, Grade 1R/2, C4d negative

## 2025-03-28 NOTE — DISCUSSION/SUMMARY
[FreeTextEntry1] : #s/p OHT 1/16/25 - Post op hemorrhage, multiple transfusions s/p return to OR on 1/17 for clot evacuation and chest closure. CDC Crossmatch negative, however, DPB1 at 3K at time of transplant.   - Echo today pending - CAV ppx: Continue ASA and Pravachol 40mg daily - RHC with elevated filling pressures on diuresis - continue Bumex 2mg daily - DSAs 1/16 DP1 3913 mfi (time of transplant), 1/28 DP1 3644 mfi, 1/30 DP1 6090 mfi; 3/4 DP1 3299 mfi; 3/18 3405 mfi, send today - This patient has a heart allograft and is at risk for rejection through immune system recognition of non-self tissue. AlloMap and AlloSure and noninvasive tests ordered to evaluate for the probability of rejection after pretest and assist in immunosuppression management. Studies have shown that these tests can help to rule out rejection without subjecting the patient to the bleeding, arrhythmia, and structural damage risks of invasive endomyocardial biopsies. The AlloMap and AlloSure tests help guide clinical decision making for this patient's surveillance schedule and immunosuppression adjustments. - continue allosure q2 weeks for now  # Immunosuppression - C/w Prograf for goal 10-12 - C/w Cellcept 1G BID - C/w prednisone taper per protocol, currently on 7.5mg BID   # Prophylaxis abx - CMV +/+: continue Valcyte 450mg QOD- currently on hold 2/2 leukopenia. CMV PCR sent today - Toxo -/-: continue Mepron daily - candida ppx: continue nystatin S&S QID - Donor GP Cocci in clusters, blood (2/2) - Staph lugdenensis mecA/C+: IV Vanco switched to Cefazolin. Completed course of abx on 1/31  # Afib, now NSR - Follows w/ EP Dr Umana  - S/p MCOT 2-week monitoring: No Afib noted. No pt triggered events.   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 Monitor Scr, U/A and urine protein/Cr in setting of steroid use  - labs ordered - C/w rheum follow-up   #EDER post op - continue to trend BUN/SCr, now downtrending Scr 1.5 down from 2.0,  down from 110 - adjust diuretics as above - renal US unremarkable - f/u with nephrocards, appreciate recs  #hyperglycemia related to steroids - continue with lantus 8u qHS and tradjenta 5mg daily - f/u with endocrine  # Health maintenance - MVI, PPI - encouraged walking program  Time spent with pt 35 min Findings, assessment and plan reviewed with Dr Schreiber RTC/TTE/Bx two weeks

## 2025-04-28 NOTE — ASSESSMENT
[FreeTextEntry1] : Systemic sclerosis (LELAND positive all other serologies negative), skin bx consistent with Scl.  1. Systemic sclerosis based on skin biopsy: has skin tightening up to the MCPs bilaterally, salt and pepper pigmentation of the skin, small oral aperture, cardiomyopathy s/p cardiac transplant, pericardial, and pleural effusion, Raynaud's with acro-osteolysis, arthralgia.  i) Skin: skin tightening up to the MCPs bilaterally, salt and pepper pigmentation of the skin, no telangiectasias. Raynaud's symptoms with acro-osteolysis. Advised conservative management with hand warmers and avoiding triggers.   ii) MSK: has arthralgia in the knees and shoulders. Manageable for now but can consider adding Plaquenil should the symptoms become persistent. Acute R knee pain at night for the last 3 days, no knee swelling, no pain with activity or at rest otherwise. advised to try conservative measures for now. If persists or worsens, will get x-rays.  iii) Pulmonary: No ILD per CT chest. Has small pleural effusions.  iv) Cardiac: s/p cardiac transplant. Being followed closely as there was an initial concern for rejection, now negative. On steroid taper. CellCept + Tacrolimus. Advised to discuss with Dr. Schreiber regarding increasing CellCept dose to 3000 mg daily once the CMV infection has been treated and continue gradual prednisone taper.  v) GI: no active issues. Patient denies GERD. No h/o peptic ulcers, GAVE, gastroparesis, GI bleed. Occasional constipation. Has liver fibrosis. vi) Renal: EDER after transplant, now improving/at baseline. Being monitored closely, may consider biopsy if worsening. Advised to monitor BP at home. Repeat labs tomorrow.  vii) Heme: anemia, stable  viii) Neuro: no active issues  2. 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   Follow up in 6 weeks

## 2025-04-28 NOTE — DATA REVIEWED
[FreeTextEntry1] : TTE (04/2025):  1. Left ventricular cavity is normal in size. Left ventricular wall thickness is normal. Left ventricular systolic function is normal. There are no regional wall motion abnormalities seen.  2. There is no evidence of a left ventricular thrombus.  3. Normal left ventricular diastolic function, with normal left ventricular filling pressure.  4. Normal right ventricular cavity size, with normal wall thickness, and normal right ventricular systolic function.  5. Mild to moderate tricuspid regurgitation.  6. Estimated pulmonary artery systolic pressure is 29 mmHg, consistent with normal pulmonary artery pressure.  7. No pericardial effusion seen.  8. Compared to the transthoracic echocardiogram performed on 3/25/2025, there have been no significant interval changes.  CT Chest, abdomen and pelvis (01/2025): Evaluation of the solid organs and vasculature is limited without intravenous contrast. CHEST: LUNGS AND LARGE AIRWAYS: Patent central airways. No pulmonary nodules. PLEURA: Small right and trace left pleural effusions with adjacent compressive atelectasis. VESSELS: Within normal limits. HEART: Heart transplant. No pericardial effusion. MEDIASTINUM AND LANI: No lymphadenopathy. Small amount of air and mediastinal drain. CHEST WALL AND LOWER NECK: Postsurgical changes status post median sternotomy. ABDOMEN AND PELVIS: LIVER: Within normal limits. BILE DUCTS: Normal caliber. GALLBLADDER: Sludge. SPLEEN: Within normal limits. PANCREAS: Within normal limits. ADRENALS: Within normal limits. KIDNEYS/URETERS: Within normal limits. BLADDER: Within normal limits. REPRODUCTIVE ORGANS: Uterus and adnexa within normal limits. BOWEL: No bowel obstruction. Appendix is normal. PERITONEUM/RETROPERITONEUM: Small ascites. VESSELS: Within normal limits. LYMPH NODES: No lymphadenopathy. ABDOMINAL WALL: Subcutaneous edema. BONES: Within normal limits. IMPRESSION: Small right and trace left pleural effusions. Small ascites.   CT Chest (12/2024): Mildly motion limited assessment. LUNGS AND LARGE AIRWAYS: Patent central airways. Clear lungs PLEURA: Right small pleural effusion. VESSELS: Within normal limits. HEART: Cardiomegaly. . Moderate pericardial effusion. New multiple intracardiac leads and AICD coil. MEDIASTINUM AND LANI: No lymphadenopathy. CHEST WALL AND LOWER NECK: New left trilead cardiac device. VISUALIZED UPPER ABDOMEN: Partially imaged gallbladder with mild edema, likely related to cardiac infection. BONES: Mild degenerative changes of the spine. IMPRESSION: Clear lungs Moderate pericardial effusion  Heart pathology (02/2025): 1  Right heart ventricle x4 2  Right heart ventricle x1 Final Diagnosis 1. Heart, transplant, right ventricle, biopsy       - No cellular rejection, ISHLT grade 0R - C4d immunostain is negative. 2. Heart, transplant, right ventricle, biopsy - Immunofluorescence for C4d is negative  CPET 11/8/2024: Protocol 10 WATT ramp, reason stopped: leg fatigue. Exercise time 8:45min. Conclusion: Abnormal CPET consistent with patient cardiomyopathy. The patient has Brito Class D cardiac failure based on the maximal oxygen uptake (peak VO2) of 8.9 ml/kg/min (pVO2<50% predicated is of greater concern in a young patient) Most markers of ventilatory efficiency were abnormal. While her cardiomyopathy is likely the proinent reason for her reduced exercise tolerance, there is also a component of deconditioning given the low markers of aerobic capacity (VO2 at AT, VO2/work slope, OUES) along with an elevated dyspnea index and exaggerated heart rate response to exercise. Of Note, the patient exhibited an oscillatory breathing pattern during early exercise which may be a marker of disease severity in heart failure patients. There are significant markers of impaired aerobic capacity and ventilatory efficiency which are particularly concerning in a young patient for poor outcomes. Would warrant an evaluation for advanced therapies. She also demonstrated an oscillatory breathing patter which may indicate an underlying sleep disorder and would recommend a sleep study. There is also a component of deconditioning and would benefit from cardiac rehab. A home exercise prescription plan will be provided to her.  Echo: TTE 9/18/24: LVEF 25% (global), mild RVE with moderately reduced function (TAPSE 1.4), normal LA, TRICIA, mild MR, mod TR, est PASP 25 mmHg, IVC normal size with normal inspiratory collapse, large circumferential pericardial effusion measuring 2.1 cm posterior/lateral to LV without clear signs of increased intrapericardial pressures  6/28/24 - EF 15%, LVEDD 5.1 cm) mod localized pericardial effusion posterior and lateral to LV; small pericardial effusion adjacent to RV; mod adjacent to RA; >60% respriatory variation across TV; no diastolic inversion of RV or diastolic collapse of RA; IVC not plethoric (1.9 cm)  6/9/24- LVEF 20%, LVIDd 5.8cm, LVPWd 0.9 cm, IVSd 0.9cm, LA 4.15cm, RV 11.20cm, RV reduced systolic function, moderate MR, PASP 44mmHg, IVC 2.22cm, moderate pericardial effusion adjacent to posterior and lateral left ventricle and right atrium, a trace pericardial effusion noted adjacent to the apex with no evidence of RV diastolic collapse.  5/9/24 - moderate pericardial effusion adjacent to the posterior left ventricle, moderate pericardial effusion adjacent to the lateral left ventricle and a small pericardial effusion adjacent to the right atrium with no evidence of hemodynamic compromise (or echocardiographic evidence of cardiac tamponade  5/3/24 - LVEF 19%, LVIDD 5.1cm, enlarged RV and decreased RV systolic function, mild MR, moderate pericardial effusion    CT/MRI: 5/10/24 - cardiac MRI - EF 13%; Enlarged right ventricle with associated hypokinesis. Normal left ventricular size with global left ventricular hypokinesis with sparing of the septum. Limited late gadolinium enhancement images. Apparent late gadolinium enhancement at the basal lateral myocardial that may represent artifact.    EP: 6/24/24 - VT ablation of LV septum with Repeat EP Study 6/26 unable to induce   Cardiac Cath/PCI: 6/20/24 - RHC/EMBx - BP missing; RA 9 (v 12), RV 52/10, PA 52/16/33, PCWP 15 (v 19), CO/CI 7.4/4.2; path - patchy interstital fibrosis and myocyte hypertrophic changes; no inflammatory infiltrate seen  5/9/24 - /68, RA 7, RV 43/7/13, PAP 42/16/29, PCWP 17 (v 24), TPG 12, CO/CI: 4.89/2.79 non-obstructive coronaries

## 2025-04-28 NOTE — HISTORY OF PRESENT ILLNESS
[___ Week(s) Ago] : [unfilled] week(s) ago [FreeTextEntry1] : Tacrolimus dose was reduced to 4 mg BID. Last cardiac biopsy done mid-April without evidence of rejection. Renal function is stable. CMV PCR slightly increased on recent labs - started back on antiviral therapy. Prednisone dose currently at 5 mg BID.

## 2025-05-01 NOTE — HISTORY OF PRESENT ILLNESS
[FreeTextEntry1] : Mr. Hernandez is a 43 y/o F w/ seronegative systemic sclerosis/scleroderma (Dx 2022 via skin biopsy), prior stage D HFrEF/NICM (EF 15%, LVEDD 5.1 cm; Dx 5/24; 2/2 systemic sclerosis confirmed by cardiac biopsy) s/p CRT-D (6/2024) as secondary prevention with prior VT s/p ablation (6/24/24) and pericardial effusion s/p heart transplant on 1/14/25 (total ischemic time 233 min, CMV +/+, Toxo -/-), post-transplant EDER (Cr peak at 2.2; now 1.0) who presents for f/u..  For details of initial HF course, please refer to note from 5/17/24 and 8/14/24.   Noted to have prior 1R/2 on biopsies with subsequent 0R. Allosure has been low. Undergoing pred taper. Noted to have low level CMV so Valcyte was resumed and is tolerating.   Pre transplant course:  1/6/25-2/6/25 - presented for RHC after she had VT degenerating into Vfib resulting in a shock while she was asleep. Had elevated BiV filling pressures with a low CO/CI and elevated PVR. Had IABP placed and required inotropes. Was listed for OHT on 1/14/25. Underwent OHT 1/16/25 (total ischemic time 233 min, CMV +/+ toxo -/-. donor GP Cocci in clusters, blood (2/2) - Staph lumeraenensis mecA/C+) with postoperative complications included hemorrhage requiring multiple transfusions overnight s/p return to OR on 1/17 for clot evacuation and chest closure. Noted to be hypotensive (preserved CI, low SVR) requiring Midodrine and Fludrocortisone which were weaned off. Had EDER (peak Cr 2.2) liang to 2.0. Her course was further complicated by pleural effusion, s/p pigtail placement on 1/23-1/30. She was also noted to have run of Afib and converted to SR. EP was consulted and had Ziopatch which was unrevealing.   Patient presents today for routine follow up visit 3.5 months post transplant.   Patient continues to feel well overall. Performs all daily activities without symptoms and walks 30min-1hr daily on walking pad. Home VS log reviewed: -120/80s, afebrile. For the past week weight has reduced to 137-139 pounds on Bumex 2mg daily. She denies edema or bloating, SOB, MANZO, orthopnea or LE edema. She also denies chest pain, palpitations, dizziness/lightheadedness. -140 mg/dL taking insulin as directed.  Moving bowels normally without diarrhea. Denies N/V/D. Appetite is good. Sternal incision "healing well" and pt puts gauze dressing on daily; no sternal discomfort or drainage. Sleeping well. No issues with taking medications and reports 100% medication compliance.

## 2025-05-01 NOTE — ASSESSMENT
[FreeTextEntry1] : Mr. Hernandez is a 43 y/o F w/ seronegative systemic sclerosis/scleroderma (Dx 2022 via skin biopsy), prior stage D HFrEF/NICM (EF 15%, LVEDD 5.1 cm; Dx 5/24; 2/2 systemic sclerosis confirmed by cardiac biopsy) s/p CRT-D (6/2024) as secondary prevention with prior VT s/p ablation (6/24/24) and pericardial effusion s/p heart transplant on 1/14/25 (total ischemic time 233 min, CMV +/+, Toxo -/-), post-transplant EDER (Cr peak at 2.2; now 1.0) who presents for f/u. Recovering well.   #s/p OHT 1/16/25 - Post op hemorrhage, multiple transfusions s/p return to OR on 1/17 for clot evacuation and chest closure. CDC Crossmatch negative, however, DPB1 at 3K at time of transplant (now <2k) - CAV ppx: Continue ASA and Pravachol 40mg daily - continue Bumex 2mg daily - This patient has a heart allograft and is at risk for rejection through immune system recognition of non-self tissue. AlloMap and AlloSure and noninvasive tests ordered to evaluate for the probability of rejection after pretest and assist in immunosuppression management. Studies have shown that these tests can help to rule out rejection without subjecting the patient to the bleeding, arrhythmia, and structural damage risks of invasive endomyocardial biopsies. The AlloMap and AlloSure tests help guide clinical decision making for this patient's surveillance schedule and immunosuppression adjustments. - continue allosure monthly   # Immunosuppression - C/w Prograf for goal 10-12 - C/w Cellcept 1250mg BID - C/w prednisone taper per protocol, currently on 5mg BID; will reduce to 7.5 mg daily   # Prophylaxis abx - CMV +/+: continue Valcyte 450mg BID (previously held for leukopenia, now resumed since 4/23 due to CMV detectable 153 copies. Continue to trend CMV weekly - Toxo -/-: Mepron switched to bactrim in s/o hypokalemia - candida ppx: will discontinue Nystatin - Donor GP Cocci in clusters, blood (2/2) - Staph lugdenensis mecA/C+: IV Vanco switched to Cefazolin. Completed course of abx on 1/31  # Afib, now NSR - Follows w/ EP Dr Umana  - S/p MCOT 2-week monitoring: No Afib noted. No pt triggered events.   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 Monitor Scr, U/A and urine protein/Cr in setting of steroid use   - C/w rheum follow-up   #EDER post op - recovered - adjust diuretics as above - renal US unremarkable - f/u with nephrocards, appreciate recs  #Steroid-induced DM - continue with lantus 8u qHS and tradjenta 5mg daily - f/u with endocrine  # Health maintenance - MVI, PPI - encouraged walking program

## 2025-05-01 NOTE — PHYSICAL EXAM
[No Edema] : no edema [Normal] : alert and oriented, normal memory [de-identified] : +midsternal chest wound incision with inferior aspect c/d/i [de-identified] : Cushingoid appearance [de-identified] : JVP approx 6-8 cm

## 2025-05-01 NOTE — CARDIOLOGY SUMMARY
[de-identified] : 3/18/25: HR 87bpm 3/3/25: sinus, low voltage, PRWP  2/18/25: HR 90bpm [de-identified] : 5/1/25 - nl EF, mild RV dysfunction, mod TR, IVC wnl  4/17/25: LVEF normal, normal RVSF, no effusion 3/25/25: LVEF is normal, mildly reduced RVSF, no effusion 3/3/25: LVEF 57% no RWMA, normal RVSF, mild-mod TR, no pericardial effusion 2/18/25: EF normal 62%, normal RVSF, mod TR, no effusion, no significant changes 1/24/25: EF 75%, no significant changes 1/21/25: LVIDs 2.5 cm, EF normal, RV normal size/fn, mild TR, no effusion, IVC 1.9 cm  [de-identified] : 3/25/25 RHC/EMBx: RA 8, PAP 31/9/20, PCWP 15, CO/CI 5.7/3.1, ISHLT Grade pending 3/18/25 RHC/EMBx #5: RA 11, PAP 38/9/20, PCWP 16, CO/CI 4.6/2.85, ISHLT Grade 0R, H&E frozen section in 2nd sample for C4d IF showed grade 2R 3/4/25 RHC/EMBx #4: RA 6, PAP 32/9/17, PCWP 8, CO/CI 5.4/3.3, Grade 0R, C4d negative 2/18/25 RHC/EMBx #3: RA 10, PA 36/12/21, PCWP 17, CO/CI 5/3.12, Grade 0R, C4d negative 2/5/25 RHC/EMBx #2: RA 12, PA 37/13/21, PCWP 11, CO/CI 5.45/3.18, Grade 0R, C4d negative  1/28/25 RHC/EMBx #1: RA 20, PA 36/18/24, PCWP 16, AO sat 100%, PA sat 52.3, Grade 1R/2, C4d negative

## 2025-05-22 NOTE — ASSESSMENT
[FreeTextEntry1] : Patient is a 42F with no prior hx of DM, seronegative systemic sclerosis/scleroderma, HFrEF/NICM, CRT-D (6/2024), VT s/p ablation 6/24, here for follow up.  # Steroid-induced hyperglycemia - No prior hx of diabetes - FS log reviewed- fasting appropriate <130 and prandial up to 180s, - HbA1c 5.4% on 4/2025 - titrated down prednisone to 7.5mg daily, with plans for further titration - Given borderline hypoglycemia with low dose insulin, stop lantus 4 units bedtime - Continue with tradjenta 5mg daily monotherapy - Advised to contact clinic if FS >140 consistently premeal/fasting  # HLD - 10/2024- , TG 95, , HDL 29 - C/w pravastatin 40mg daily - Pt to follow with pcp and cardiology team  # vitD deficiency - On 10/2024, vitD 21.4, trending up from 9.7 prior - C/w vitD supplement daily   RTC 4 months

## 2025-05-22 NOTE — HISTORY OF PRESENT ILLNESS
[FreeTextEntry1] : Patient is a 42F with no prior hx of DM, seronegative systemic sclerosis/scleroderma, HFrEF/NICM, CRT-D (6/2024), VT s/p ablation 6/24, here for follow up.  Pt was admitted to [facility] and s/p open heart transplant on 1/16/2025. Pt was started on methylprednisolone taper, transitioned to prednisone.  Endocrine history: Prior to hospitalization, no hx of preDM or DM She reports she was on steroids for some time last year by her rheumatologist at which time she was told once that her glucose level was a bit high.  S/p open heart transplant on 1/16/2025. Pt was on steroids on/off in 2024 but resumed after heart transplant on 1/2025.  For steroid induced hyperglycemia, on lantus pen 4u QHS and Tradjenta 5mg daily. Pt remained on prednisone 12.5mg BID last visit but now titrated down to 7.5mg daily- with plans for further tapering down HbA1c 5.9% on 2/2025 > 5.4% 5/2025 FS log- checks twice daily. Reports FS in 80-90 range consistently without hyperglycemia. Denies overt hypoglycemia s/s  SHx: Denies smoking or ETOH FHx: Denies DM

## 2025-06-12 NOTE — CARDIOLOGY SUMMARY
[de-identified] : 3/18/25: HR 87bpm 3/3/25: sinus, low voltage, PRWP  2/18/25: HR 90bpm [de-identified] : 6/9/25 - pending 5/1/25 - nl EF, mild RV dysfunction, mod TR, IVC wnl  4/17/25: LVEF normal, normal RVSF, no effusion 3/25/25: LVEF is normal, mildly reduced RVSF, no effusion 3/3/25: LVEF 57% no RWMA, normal RVSF, mild-mod TR, no pericardial effusion 2/18/25: EF normal 62%, normal RVSF, mod TR, no effusion, no significant changes 1/24/25: EF 75%, no significant changes 1/21/25: LVIDs 2.5 cm, EF normal, RV normal size/fn, mild TR, no effusion, IVC 1.9 cm  [de-identified] : 3/25/25 RHC/EMBx: RA 8, PAP 31/9/20, PCWP 15, CO/CI 5.7/3.1, ISHLT Grade pending 3/18/25 RHC/EMBx #5: RA 11, PAP 38/9/20, PCWP 16, CO/CI 4.6/2.85, ISHLT Grade 0R, H&E frozen section in 2nd sample for C4d IF showed grade 2R 3/4/25 RHC/EMBx #4: RA 6, PAP 32/9/17, PCWP 8, CO/CI 5.4/3.3, Grade 0R, C4d negative 2/18/25 RHC/EMBx #3: RA 10, PA 36/12/21, PCWP 17, CO/CI 5/3.12, Grade 0R, C4d negative 2/5/25 RHC/EMBx #2: RA 12, PA 37/13/21, PCWP 11, CO/CI 5.45/3.18, Grade 0R, C4d negative  1/28/25 RHC/EMBx #1: RA 20, PA 36/18/24, PCWP 16, AO sat 100%, PA sat 52.3, Grade 1R/2, C4d negative

## 2025-06-12 NOTE — ASSESSMENT
[FreeTextEntry1] : Mr. Hernandez is a 43 y/o F w/ seronegative systemic sclerosis/scleroderma (Dx 2022 via skin biopsy), prior stage D HFrEF/NICM (EF 15%, LVEDD 5.1 cm; Dx 5/24; 2/2 systemic sclerosis confirmed by cardiac biopsy) s/p CRT-D (6/2024) as secondary prevention with prior VT s/p ablation (6/24/24) and pericardial effusion s/p heart transplant on 1/14/25 (total ischemic time 233 min, CMV +/+, Toxo -/-), post-transplant EDER (Cr peak at 2.2; now 1.0) who presents for f/u. Recovering well.   #s/p OHT 1/16/25 - Post op hemorrhage, multiple transfusions s/p return to OR on 1/17 for clot evacuation and chest closure. CDC Crossmatch negative, however, DPB1 at 3K at time of transplant (now <2k) - CAV ppx: Continue ASA and Pravachol 40mg daily - remains off bumex - This patient has a heart allograft and is at risk for rejection through immune system recognition of non-self tissue. AlloMap and AlloSure and noninvasive tests ordered to evaluate for the probability of rejection after pretest and assist in immunosuppression management. Studies have shown that these tests can help to rule out rejection without subjecting the patient to the bleeding, arrhythmia, and structural damage risks of invasive endomyocardial biopsies. The AlloMap and AlloSure tests help guide clinical decision making for this patient's surveillance schedule and immunosuppression adjustments. - continue allosure monthly   # Immunosuppression - C/w Prograf for goal 10-12 - C/w Cellcept 1250mg BID - C/w prednisone taper per protocol, currently on 7.5mg daily, reduce with each normal heartcare  # Prophylaxis abx - CMV +/+: continue Valcyte 450mg BID (previously held for leukopenia, now resumed since 4/23 due to CMV detectable 153 copies> now not detected. Continue to trend CMV monthly - Toxo -/-: Mepron switched to bactrim in s/o hypokalemia - candida ppx: discontinued Nystatin once prednisone < 10mg daily - Donor GP Cocci in clusters, blood (2/2) - Staph lugdenensis mecA/C+: IV Vanco switched to Cefazolin. Completed course of abx on 1/31  # Afib, now NSR - Follows w/ EP Dr Umana  - S/p MCASIF 2-week monitoring: No Afib noted. No pt triggered events.   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 Monitor Scr, U/A and urine protein/Cr in setting of steroid use   - C/w rheum follow-up   #EDER post op - recovered - remains off diuretics - renal US unremarkable - f/u with nephrocards, appreciate recs  #Steroid-induced DM - continue with tradjenta 5mg daily - f/u with endocrine  # Health maintenance - MVI, PPI - encouraged walking program  RTC/TTE one month Time spent with patient 35 minutes Findings, assessment and plan reviewed with Dr Schreiber

## 2025-06-12 NOTE — PHYSICAL EXAM
[No Edema] : no edema [Normal] : alert and oriented, normal memory [de-identified] : Cushingoid appearance [de-identified] : JVP approx 6-8 cm

## 2025-06-12 NOTE — CARDIOLOGY SUMMARY
[de-identified] : 3/18/25: HR 87bpm 3/3/25: sinus, low voltage, PRWP  2/18/25: HR 90bpm [de-identified] : 6/9/25 - pending 5/1/25 - nl EF, mild RV dysfunction, mod TR, IVC wnl  4/17/25: LVEF normal, normal RVSF, no effusion 3/25/25: LVEF is normal, mildly reduced RVSF, no effusion 3/3/25: LVEF 57% no RWMA, normal RVSF, mild-mod TR, no pericardial effusion 2/18/25: EF normal 62%, normal RVSF, mod TR, no effusion, no significant changes 1/24/25: EF 75%, no significant changes 1/21/25: LVIDs 2.5 cm, EF normal, RV normal size/fn, mild TR, no effusion, IVC 1.9 cm  [de-identified] : 3/25/25 RHC/EMBx: RA 8, PAP 31/9/20, PCWP 15, CO/CI 5.7/3.1, ISHLT Grade pending 3/18/25 RHC/EMBx #5: RA 11, PAP 38/9/20, PCWP 16, CO/CI 4.6/2.85, ISHLT Grade 0R, H&E frozen section in 2nd sample for C4d IF showed grade 2R 3/4/25 RHC/EMBx #4: RA 6, PAP 32/9/17, PCWP 8, CO/CI 5.4/3.3, Grade 0R, C4d negative 2/18/25 RHC/EMBx #3: RA 10, PA 36/12/21, PCWP 17, CO/CI 5/3.12, Grade 0R, C4d negative 2/5/25 RHC/EMBx #2: RA 12, PA 37/13/21, PCWP 11, CO/CI 5.45/3.18, Grade 0R, C4d negative  1/28/25 RHC/EMBx #1: RA 20, PA 36/18/24, PCWP 16, AO sat 100%, PA sat 52.3, Grade 1R/2, C4d negative

## 2025-06-12 NOTE — ASSESSMENT
[FreeTextEntry1] : Mr. Hernandez is a 41 y/o F w/ seronegative systemic sclerosis/scleroderma (Dx 2022 via skin biopsy), prior stage D HFrEF/NICM (EF 15%, LVEDD 5.1 cm; Dx 5/24; 2/2 systemic sclerosis confirmed by cardiac biopsy) s/p CRT-D (6/2024) as secondary prevention with prior VT s/p ablation (6/24/24) and pericardial effusion s/p heart transplant on 1/14/25 (total ischemic time 233 min, CMV +/+, Toxo -/-), post-transplant EDER (Cr peak at 2.2; now 1.0) who presents for f/u. Recovering well.   #s/p OHT 1/16/25 - Post op hemorrhage, multiple transfusions s/p return to OR on 1/17 for clot evacuation and chest closure. CDC Crossmatch negative, however, DPB1 at 3K at time of transplant (now <2k) - CAV ppx: Continue ASA and Pravachol 40mg daily - remains off bumex - This patient has a heart allograft and is at risk for rejection through immune system recognition of non-self tissue. AlloMap and AlloSure and noninvasive tests ordered to evaluate for the probability of rejection after pretest and assist in immunosuppression management. Studies have shown that these tests can help to rule out rejection without subjecting the patient to the bleeding, arrhythmia, and structural damage risks of invasive endomyocardial biopsies. The AlloMap and AlloSure tests help guide clinical decision making for this patient's surveillance schedule and immunosuppression adjustments. - continue allosure monthly   # Immunosuppression - C/w Prograf for goal 10-12 - C/w Cellcept 1250mg BID - C/w prednisone taper per protocol, currently on 7.5mg daily, reduce with each normal heartcare  # Prophylaxis abx - CMV +/+: continue Valcyte 450mg BID (previously held for leukopenia, now resumed since 4/23 due to CMV detectable 153 copies> now not detected. Continue to trend CMV monthly - Toxo -/-: Mepron switched to bactrim in s/o hypokalemia - candida ppx: discontinued Nystatin once prednisone < 10mg daily - Donor GP Cocci in clusters, blood (2/2) - Staph lugdenensis mecA/C+: IV Vanco switched to Cefazolin. Completed course of abx on 1/31  # Afib, now NSR - Follows w/ EP Dr Umana  - S/p MCASIF 2-week monitoring: No Afib noted. No pt triggered events.   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 Monitor Scr, U/A and urine protein/Cr in setting of steroid use   - C/w rheum follow-up   #EDER post op - recovered - remains off diuretics - renal US unremarkable - f/u with nephrocards, appreciate recs  #Steroid-induced DM - continue with tradjenta 5mg daily - f/u with endocrine  # Health maintenance - MVI, PPI - encouraged walking program  RTC/TTE one month Time spent with patient 35 minutes Findings, assessment and plan reviewed with Dr Schreiber

## 2025-06-12 NOTE — HISTORY OF PRESENT ILLNESS
[FreeTextEntry1] : Mr. Hernandez is a 43 y/o F w/ seronegative systemic sclerosis/scleroderma (Dx 2022 via skin biopsy), prior stage D HFrEF/NICM (EF 15%, LVEDD 5.1 cm; Dx 5/24; 2/2 systemic sclerosis confirmed by cardiac biopsy) s/p CRT-D (6/2024) as secondary prevention with prior VT s/p ablation (6/24/24) and pericardial effusion s/p heart transplant on 1/14/25 (total ischemic time 233 min, CMV +/+, Toxo -/-), post-transplant EDER (Cr peak at 2.2; now 1.0) who presents for f/u..  For details of initial HF course, please refer to note from 5/17/24 and 8/14/24.   Noted to have prior 1R/2 on biopsies with subsequent 0R. Allosure has been low. Undergoing pred taper. Noted to have low level CMV so Valcyte was resumed and is tolerating.   Pre transplant course:  1/6/25-2/6/25 - presented for RHC after she had VT degenerating into Vfib resulting in a shock while she was asleep. Had elevated BiV filling pressures with a low CO/CI and elevated PVR. Had IABP placed and required inotropes. Was listed for OHT on 1/14/25. Underwent OHT 1/16/25 (total ischemic time 233 min, CMV +/+ toxo -/-. donor GP Cocci in clusters, blood (2/2) - Staph lugdenensis mecA/C+) with postoperative complications included hemorrhage requiring multiple transfusions overnight s/p return to OR on 1/17 for clot evacuation and chest closure. Noted to be hypotensive (preserved CI, low SVR) requiring Midodrine and Fludrocortisone which were weaned off. Had EDER (peak Cr 2.2) liang to 2.0. Her course was further complicated by pleural effusion, s/p pigtail placement on 1/23-1/30. She was also noted to have run of Afib and converted to SR. EP was consulted and had Ziopatch which was unrevealing.   Patient presents today for routine follow up visit 5 months post -transplant. Patient continues to feel excellent. Walks at least 6000 steps daily. Home VS log reviewed: -120/80's, afebrile. Home weight 132-135lbs and has been off bumex x 2 weeks.  -140 mg/dL and remains off lantus since end of May.  Moving bowels normally without diarrhea. Denies N/V/D. Appetite is good. Sternal incision without drainage, has some skin sensitivity. Sleeping well. She denies edema or bloating, SOB, MANZO, orthopnea or LE edema. She also denies chest pain, palpitations, dizziness/lightheadedness.   No issues with taking medications and reports 100% medication compliance.

## 2025-06-12 NOTE — PHYSICAL EXAM
[No Edema] : no edema [Normal] : alert and oriented, normal memory [de-identified] : Cushingoid appearance [de-identified] : JVP approx 6-8 cm

## 2025-07-17 NOTE — CARDIOLOGY SUMMARY
[de-identified] : 3/18/25: HR 87bpm 3/3/25: sinus, low voltage, PRWP  2/18/25: HR 90bpm [de-identified] : 6/9/25 - LVEF normal, EF 70%, no RWMA, RVSF probably normal 5/1/25 - nl EF, mild RV dysfunction, mod TR, IVC wnl  4/17/25: LVEF normal, normal RVSF, no effusion 3/25/25: LVEF is normal, mildly reduced RVSF, no effusion 3/3/25: LVEF 57% no RWMA, normal RVSF, mild-mod TR, no pericardial effusion 2/18/25: EF normal 62%, normal RVSF, mod TR, no effusion, no significant changes 1/24/25: EF 75%, no significant changes 1/21/25: LVIDs 2.5 cm, EF normal, RV normal size/fn, mild TR, no effusion, IVC 1.9 cm  [de-identified] : 3/25/25 RHC/EMBx: RA 8, PAP 31/9/20, PCWP 15, CO/CI 5.7/3.1, ISHLT Grade pending 3/18/25 RHC/EMBx #5: RA 11, PAP 38/9/20, PCWP 16, CO/CI 4.6/2.85, ISHLT Grade 0R, H&E frozen section in 2nd sample for C4d IF showed grade 2R 3/4/25 RHC/EMBx #4: RA 6, PAP 32/9/17, PCWP 8, CO/CI 5.4/3.3, Grade 0R, C4d negative 2/18/25 RHC/EMBx #3: RA 10, PA 36/12/21, PCWP 17, CO/CI 5/3.12, Grade 0R, C4d negative 2/5/25 RHC/EMBx #2: RA 12, PA 37/13/21, PCWP 11, CO/CI 5.45/3.18, Grade 0R, C4d negative  1/28/25 RHC/EMBx #1: RA 20, PA 36/18/24, PCWP 16, AO sat 100%, PA sat 52.3, Grade 1R/2, C4d negative

## 2025-07-17 NOTE — HISTORY OF PRESENT ILLNESS
[FreeTextEntry1] : Mr. Hernandez is a 43 y/o F w/ seronegative systemic sclerosis/scleroderma (Dx 2022 via skin biopsy), prior stage D HFrEF/NICM (EF 15%, LVEDD 5.1 cm; Dx 5/24; 2/2 systemic sclerosis confirmed by cardiac biopsy) s/p CRT-D (6/2024) as secondary prevention with prior VT s/p ablation (6/24/24) and pericardial effusion s/p heart transplant on 1/14/25 (total ischemic time 233 min, CMV +/+, Toxo -/-), post-transplant EDER (Cr peak at 2.2; now 1.0) who presents for f/u..  For details of initial HF course, please refer to note from 5/17/24 and 8/14/24.   Noted to have prior 1R/2 on biopsies with subsequent 0R. Allosure has been low. Undergoing pred taper. Noted to have low level CMV so Valcyte was resumed and is tolerating.   Pre transplant course:  1/6/25-2/6/25 - presented for RHC after she had VT degenerating into Vfib resulting in a shock while she was asleep. Had elevated BiV filling pressures with a low CO/CI and elevated PVR. Had IABP placed and required inotropes. Was listed for OHT on 1/14/25. Underwent OHT 1/16/25 (total ischemic time 233 min, CMV +/+ toxo -/-. donor GP Cocci in clusters, blood (2/2) - Staph lugdenensis mecA/C+) with postoperative complications included hemorrhage requiring multiple transfusions overnight s/p return to OR on 1/17 for clot evacuation and chest closure. Noted to be hypotensive (preserved CI, low SVR) requiring Midodrine and Fludrocortisone which were weaned off. Had EDER (peak Cr 2.2) liang to 2.0. Her course was further complicated by pleural effusion, s/p pigtail placement on 1/23-1/30. She was also noted to have run of Afib and converted to SR. EP was consulted and had Ziopatch which was unrevealing.   Patient presents today for routine follow up visit 6 months post -transplant.  Did not do cardiac rehab - walks 7132-1650 steps daily.  [place] to [place] this [place] (first time leaving the state since transplant). Home VS log- BP: 120/80s, -120; Wt: 127lb off bumex; FS intermittently - 80-90s (highest high 90s) Has had poor appetite since April. No strange tastes while eating. Does not feel full easily. "Eating just to eat" - since April. No diarrhea. Normal BMs.   Patient continues to feel excellent. Walks at least 6000 steps daily. Home VS log reviewed: -120/80's, afebrile. Home weight 132-135lbs and has been off bumex x 2 weeks.  -140 mg/dL and remains off lantus since end of May.  Moving bowels normally without diarrhea. Denies N/V/D. Appetite is good. Sternal incision without drainage, has some skin sensitivity. Sleeping well. She denies edema or bloating, SOB, MANZO, orthopnea or LE edema. She also denies chest pain, palpitations, dizziness/lightheadedness.   No issues with taking medications and reports 100% medication compliance.

## 2025-07-17 NOTE — PHYSICAL EXAM
[No Edema] : no edema [Normal] : alert and oriented, normal memory [de-identified] : Cushingoid appearance [de-identified] : JVP approx 6-8 cm

## 2025-07-17 NOTE — ASSESSMENT
[FreeTextEntry1] : Mr. Hernandez is a 43 y/o F w/ seronegative systemic sclerosis/scleroderma (Dx 2022 via skin biopsy), prior stage D HFrEF/NICM (EF 15%, LVEDD 5.1 cm; Dx 5/24; 2/2 systemic sclerosis confirmed by cardiac biopsy) s/p CRT-D (6/2024) as secondary prevention with prior VT s/p ablation (6/24/24) and pericardial effusion s/p heart transplant on 1/14/25 (total ischemic time 233 min, CMV +/+, Toxo -/-), post-transplant EDER (Cr peak at 2.2; now 1.0) who presents for f/u. Recovering well.   #s/p OHT 1/16/25 - Post op hemorrhage, multiple transfusions s/p return to OR on 1/17 for clot evacuation and chest closure. CDC Crossmatch negative, however, DPB1 at 3K at time of transplant (now <2k) - CAV ppx: Continue ASA and Pravachol 40mg daily - remains off bumex - This patient has a heart allograft and is at risk for rejection through immune system recognition of non-self tissue. AlloMap and AlloSure and noninvasive tests ordered to evaluate for the probability of rejection after pretest and assist in immunosuppression management. Studies have shown that these tests can help to rule out rejection without subjecting the patient to the bleeding, arrhythmia, and structural damage risks of invasive endomyocardial biopsies. The AlloMap and AlloSure tests help guide clinical decision making for this patient's surveillance schedule and immunosuppression adjustments. - continue allosure monthly   # Immunosuppression - C/w Prograf for goal 10-12 - C/w Cellcept 1250mg BID - C/w prednisone taper per protocol, currently on 5mg daily, reduce with each normal heartcare  # Prophylaxis abx - CMV +/+: continue Valcyte 450mg BID (previously held for leukopenia, now resumed since 4/23 due to CMV detectable 153 copies> now not detected. Continue to trend CMV monthly - Toxo -/-: Mepron switched to bactrim in s/o hypokalemia - candida ppx: discontinued Nystatin once prednisone < 10mg daily - Donor GP Cocci in clusters, blood (2/2) - Staph lugdenensis mecA/C+: IV Vanco switched to Cefazolin. Completed course of abx on 1/31  # Afib, now NSR - Follows w/ EP Dr Umana  - S/p MCOT 2-week monitoring: No Afib noted. No pt triggered events.   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 Monitor Scr, U/A and urine protein/Cr in setting of steroid use   - C/w rheum follow-up   #EDER post op - recovered - remains off diuretics - renal US unremarkable - f/u with nephrocards, appreciate recs  #Steroid-induced DM - continue with tradjenta 5mg daily - f/u with endocrine  # Health maintenance - MVI, PPI - encouraged walking program  RTC/TTE one month Time spent with patient 35 minutes Findings, assessment and plan reviewed with Dr Schreiber